# Patient Record
Sex: FEMALE | HISPANIC OR LATINO | Employment: UNEMPLOYED
[De-identification: names, ages, dates, MRNs, and addresses within clinical notes are randomized per-mention and may not be internally consistent; named-entity substitution may affect disease eponyms.]

---

## 2017-05-23 ENCOUNTER — RECORDS - HEALTHEAST (OUTPATIENT)
Dept: ADMINISTRATIVE | Facility: OTHER | Age: 37
End: 2017-05-23

## 2017-08-09 ENCOUNTER — RECORDS - HEALTHEAST (OUTPATIENT)
Dept: ADMINISTRATIVE | Facility: OTHER | Age: 37
End: 2017-08-09

## 2017-08-24 ENCOUNTER — RECORDS - HEALTHEAST (OUTPATIENT)
Dept: ADMINISTRATIVE | Facility: OTHER | Age: 37
End: 2017-08-24

## 2017-08-30 ENCOUNTER — RECORDS - HEALTHEAST (OUTPATIENT)
Dept: ADMINISTRATIVE | Facility: OTHER | Age: 37
End: 2017-08-30

## 2017-09-01 ENCOUNTER — COMMUNICATION - HEALTHEAST (OUTPATIENT)
Dept: ADMINISTRATIVE | Facility: CLINIC | Age: 37
End: 2017-09-01

## 2017-09-01 ENCOUNTER — RECORDS - HEALTHEAST (OUTPATIENT)
Dept: ADMINISTRATIVE | Facility: OTHER | Age: 37
End: 2017-09-01

## 2017-09-03 ENCOUNTER — HEALTH MAINTENANCE LETTER (OUTPATIENT)
Age: 37
End: 2017-09-03

## 2017-09-20 ENCOUNTER — COMMUNICATION - HEALTHEAST (OUTPATIENT)
Dept: OBGYN | Facility: CLINIC | Age: 37
End: 2017-09-20

## 2017-10-06 ENCOUNTER — RECORDS - HEALTHEAST (OUTPATIENT)
Dept: ADMINISTRATIVE | Facility: OTHER | Age: 37
End: 2017-10-06

## 2017-10-11 ENCOUNTER — RECORDS - HEALTHEAST (OUTPATIENT)
Dept: ADMINISTRATIVE | Facility: OTHER | Age: 37
End: 2017-10-11

## 2017-10-11 ENCOUNTER — COMMUNICATION - HEALTHEAST (OUTPATIENT)
Dept: ADMINISTRATIVE | Facility: CLINIC | Age: 37
End: 2017-10-11

## 2017-10-17 ENCOUNTER — COMMUNICATION - HEALTHEAST (OUTPATIENT)
Dept: OBGYN | Facility: CLINIC | Age: 37
End: 2017-10-17

## 2017-10-20 ENCOUNTER — RECORDS - HEALTHEAST (OUTPATIENT)
Dept: ADMINISTRATIVE | Facility: OTHER | Age: 37
End: 2017-10-20

## 2017-10-24 ENCOUNTER — RECORDS - HEALTHEAST (OUTPATIENT)
Dept: ADMINISTRATIVE | Facility: OTHER | Age: 37
End: 2017-10-24

## 2017-12-07 ENCOUNTER — RECORDS - HEALTHEAST (OUTPATIENT)
Dept: ADMINISTRATIVE | Facility: OTHER | Age: 37
End: 2017-12-07

## 2017-12-22 ENCOUNTER — HOSPITAL ENCOUNTER (OUTPATIENT)
Dept: MEDSURG UNIT | Facility: CLINIC | Age: 37
Discharge: HOME | End: 2017-12-23
Attending: OBSTETRICS & GYNECOLOGY | Admitting: OBSTETRICS & GYNECOLOGY

## 2017-12-22 ASSESSMENT — MIFFLIN-ST. JEOR: SCORE: 1322.25

## 2017-12-23 ENCOUNTER — RECORDS - HEALTHEAST (OUTPATIENT)
Dept: ADMINISTRATIVE | Facility: OTHER | Age: 37
End: 2017-12-23

## 2017-12-26 ENCOUNTER — HOME CARE/HOSPICE - HEALTHEAST (OUTPATIENT)
Dept: HOME HEALTH SERVICES | Facility: HOME HEALTH | Age: 37
End: 2017-12-26

## 2017-12-28 ENCOUNTER — HOME CARE/HOSPICE - HEALTHEAST (OUTPATIENT)
Dept: HOME HEALTH SERVICES | Facility: HOME HEALTH | Age: 37
End: 2017-12-28

## 2018-02-15 ENCOUNTER — RECORDS - HEALTHEAST (OUTPATIENT)
Dept: ADMINISTRATIVE | Facility: OTHER | Age: 38
End: 2018-02-15

## 2018-09-10 ENCOUNTER — HEALTH MAINTENANCE LETTER (OUTPATIENT)
Age: 38
End: 2018-09-10

## 2019-11-07 ENCOUNTER — HEALTH MAINTENANCE LETTER (OUTPATIENT)
Age: 39
End: 2019-11-07

## 2020-07-08 ENCOUNTER — COMMUNICATION - HEALTHEAST (OUTPATIENT)
Dept: ADMINISTRATIVE | Facility: CLINIC | Age: 40
End: 2020-07-08

## 2020-07-10 ENCOUNTER — COMMUNICATION - HEALTHEAST (OUTPATIENT)
Dept: MIDWIFE SERVICES | Facility: CLINIC | Age: 40
End: 2020-07-10

## 2020-07-10 ENCOUNTER — COMMUNICATION - HEALTHEAST (OUTPATIENT)
Dept: ADMINISTRATIVE | Facility: CLINIC | Age: 40
End: 2020-07-10

## 2020-07-20 ENCOUNTER — OFFICE VISIT - HEALTHEAST (OUTPATIENT)
Dept: MIDWIFE SERVICES | Facility: CLINIC | Age: 40
End: 2020-07-20

## 2020-07-20 DIAGNOSIS — D68.00 VON WILLEBRAND DISEASE (H): ICD-10-CM

## 2020-07-20 DIAGNOSIS — N92.6 MISSED MENSES: ICD-10-CM

## 2020-07-20 LAB — HCG UR QL: POSITIVE

## 2020-07-20 ASSESSMENT — MIFFLIN-ST. JEOR: SCORE: 1191.38

## 2020-07-22 ENCOUNTER — AMBULATORY - HEALTHEAST (OUTPATIENT)
Dept: MIDWIFE SERVICES | Facility: CLINIC | Age: 40
End: 2020-07-22

## 2020-07-23 ENCOUNTER — AMBULATORY - HEALTHEAST (OUTPATIENT)
Dept: OBGYN | Facility: CLINIC | Age: 40
End: 2020-07-23

## 2020-07-23 ENCOUNTER — COMMUNICATION - HEALTHEAST (OUTPATIENT)
Dept: SCHEDULING | Facility: CLINIC | Age: 40
End: 2020-07-23

## 2020-07-27 ENCOUNTER — RECORDS - HEALTHEAST (OUTPATIENT)
Dept: ADMINISTRATIVE | Facility: OTHER | Age: 40
End: 2020-07-27

## 2020-11-12 ENCOUNTER — AMBULATORY - HEALTHEAST (OUTPATIENT)
Dept: MATERNAL FETAL MEDICINE | Facility: HOSPITAL | Age: 40
End: 2020-11-12

## 2020-11-12 ENCOUNTER — RECORDS - HEALTHEAST (OUTPATIENT)
Dept: ADMINISTRATIVE | Facility: OTHER | Age: 40
End: 2020-11-12

## 2020-11-12 DIAGNOSIS — O26.90 PREGNANCY, ANTEPARTUM, COMPLICATIONS: ICD-10-CM

## 2020-11-27 ENCOUNTER — AMBULATORY - HEALTHEAST (OUTPATIENT)
Dept: MATERNAL FETAL MEDICINE | Facility: HOSPITAL | Age: 40
End: 2020-11-27

## 2020-11-29 ENCOUNTER — HEALTH MAINTENANCE LETTER (OUTPATIENT)
Age: 40
End: 2020-11-29

## 2020-12-02 ENCOUNTER — OFFICE VISIT - HEALTHEAST (OUTPATIENT)
Dept: MATERNAL FETAL MEDICINE | Facility: HOSPITAL | Age: 40
End: 2020-12-02

## 2020-12-02 DIAGNOSIS — O09.521 AMA (ADVANCED MATERNAL AGE) MULTIGRAVIDA 35+, FIRST TRIMESTER: ICD-10-CM

## 2020-12-02 DIAGNOSIS — E72.12 MTHFR (METHYLENE THF REDUCTASE) DEFICIENCY AND HOMOCYSTINURIA (H): ICD-10-CM

## 2020-12-02 DIAGNOSIS — O30.041 TWIN PREGNANCY, DICHORIONIC/DIAMNIOTIC, FIRST TRIMESTER: ICD-10-CM

## 2020-12-02 DIAGNOSIS — E72.11 MTHFR (METHYLENE THF REDUCTASE) DEFICIENCY AND HOMOCYSTINURIA (H): ICD-10-CM

## 2020-12-02 DIAGNOSIS — Z83.2 FAMILY HISTORY OF VON WILLEBRAND DISEASE: ICD-10-CM

## 2020-12-02 DIAGNOSIS — D68.00 VON WILLEBRAND DISEASE (H): ICD-10-CM

## 2020-12-02 DIAGNOSIS — Z36.9 PRENATAL SCREENING ENCOUNTER: ICD-10-CM

## 2020-12-02 DIAGNOSIS — O26.90 PREGNANCY, ANTEPARTUM, COMPLICATIONS: ICD-10-CM

## 2020-12-02 DIAGNOSIS — Z80.41 FAMILY HISTORY OF OVARIAN CANCER: ICD-10-CM

## 2020-12-02 DIAGNOSIS — O26.21 RECURRENT PREGNANCY LOSS IN PREGNANT PATIENT IN FIRST TRIMESTER, ANTEPARTUM: ICD-10-CM

## 2020-12-02 DIAGNOSIS — Z15.89 COMPOUND HETEROZYGOUS MTHFR MUTATION C677T/A1298C: ICD-10-CM

## 2020-12-09 ENCOUNTER — COMMUNICATION - HEALTHEAST (OUTPATIENT)
Dept: MATERNAL FETAL MEDICINE | Facility: HOSPITAL | Age: 40
End: 2020-12-09

## 2020-12-09 DIAGNOSIS — Z36.9 PRENATAL SCREENING ENCOUNTER: ICD-10-CM

## 2020-12-09 DIAGNOSIS — O09.521 AMA (ADVANCED MATERNAL AGE) MULTIGRAVIDA 35+, FIRST TRIMESTER: ICD-10-CM

## 2020-12-09 DIAGNOSIS — O30.049 TWIN GESTATION, DICHORIONIC DIAMNIOTIC: ICD-10-CM

## 2020-12-10 ENCOUNTER — AMBULATORY - HEALTHEAST (OUTPATIENT)
Dept: LAB | Facility: HOSPITAL | Age: 40
End: 2020-12-10

## 2020-12-10 DIAGNOSIS — O30.049 TWIN GESTATION, DICHORIONIC DIAMNIOTIC: ICD-10-CM

## 2020-12-10 DIAGNOSIS — Z36.9 PRENATAL SCREENING ENCOUNTER: ICD-10-CM

## 2020-12-10 DIAGNOSIS — O09.521 AMA (ADVANCED MATERNAL AGE) MULTIGRAVIDA 35+, FIRST TRIMESTER: ICD-10-CM

## 2020-12-14 ENCOUNTER — COMMUNICATION - HEALTHEAST (OUTPATIENT)
Dept: MATERNAL FETAL MEDICINE | Facility: HOSPITAL | Age: 40
End: 2020-12-14

## 2020-12-14 LAB — TRISOMY 21,18,13 - HE HISTORICAL: NORMAL

## 2021-01-14 ENCOUNTER — RECORDS - HEALTHEAST (OUTPATIENT)
Dept: ADMINISTRATIVE | Facility: OTHER | Age: 41
End: 2021-01-14

## 2021-01-22 ENCOUNTER — OFFICE VISIT - HEALTHEAST (OUTPATIENT)
Dept: MATERNAL FETAL MEDICINE | Facility: HOSPITAL | Age: 41
End: 2021-01-22

## 2021-01-22 ENCOUNTER — RECORDS - HEALTHEAST (OUTPATIENT)
Dept: ULTRASOUND IMAGING | Facility: HOSPITAL | Age: 41
End: 2021-01-22

## 2021-01-22 ENCOUNTER — RECORDS - HEALTHEAST (OUTPATIENT)
Dept: ADMINISTRATIVE | Facility: OTHER | Age: 41
End: 2021-01-22

## 2021-01-22 DIAGNOSIS — O30.042 DICHORIONIC DIAMNIOTIC TWIN PREGNANCY IN SECOND TRIMESTER: ICD-10-CM

## 2021-01-22 DIAGNOSIS — O26.90 PREGNANCY RELATED CONDITIONS, UNSPECIFIED, UNSPECIFIED TRIMESTER: ICD-10-CM

## 2021-01-28 ENCOUNTER — RECORDS - HEALTHEAST (OUTPATIENT)
Dept: ADMINISTRATIVE | Facility: OTHER | Age: 41
End: 2021-01-28

## 2021-02-26 ENCOUNTER — AMBULATORY - HEALTHEAST (OUTPATIENT)
Dept: MATERNAL FETAL MEDICINE | Facility: HOSPITAL | Age: 41
End: 2021-02-26

## 2021-02-26 ENCOUNTER — RECORDS - HEALTHEAST (OUTPATIENT)
Dept: ADMINISTRATIVE | Facility: OTHER | Age: 41
End: 2021-02-26

## 2021-02-26 DIAGNOSIS — O26.90 PREGNANCY, ANTEPARTUM, COMPLICATIONS: ICD-10-CM

## 2021-03-03 ENCOUNTER — OFFICE VISIT - HEALTHEAST (OUTPATIENT)
Dept: MATERNAL FETAL MEDICINE | Facility: HOSPITAL | Age: 41
End: 2021-03-03

## 2021-03-03 ENCOUNTER — RECORDS - HEALTHEAST (OUTPATIENT)
Dept: ADMINISTRATIVE | Facility: OTHER | Age: 41
End: 2021-03-03

## 2021-03-03 ENCOUNTER — RECORDS - HEALTHEAST (OUTPATIENT)
Dept: ULTRASOUND IMAGING | Facility: HOSPITAL | Age: 41
End: 2021-03-03

## 2021-03-03 DIAGNOSIS — O26.90 PREGNANCY RELATED CONDITIONS, UNSPECIFIED, UNSPECIFIED TRIMESTER: ICD-10-CM

## 2021-03-03 DIAGNOSIS — O30.042 DICHORIONIC DIAMNIOTIC TWIN PREGNANCY IN SECOND TRIMESTER: ICD-10-CM

## 2021-03-27 ENCOUNTER — HOSPITAL ENCOUNTER (OUTPATIENT)
Dept: OBGYN | Facility: HOSPITAL | Age: 41
Discharge: HOME | End: 2021-03-27
Attending: OBSTETRICS & GYNECOLOGY | Admitting: OBSTETRICS & GYNECOLOGY

## 2021-03-27 ENCOUNTER — COMMUNICATION - HEALTHEAST (OUTPATIENT)
Dept: SCHEDULING | Facility: CLINIC | Age: 41
End: 2021-03-27

## 2021-03-27 LAB
ALBUMIN UR-MCNC: NEGATIVE G/DL
APPEARANCE UR: CLEAR
BILIRUB UR QL STRIP: NEGATIVE
COLOR UR AUTO: COLORLESS
GLUCOSE UR STRIP-MCNC: ABNORMAL MG/DL
HGB UR QL STRIP: NEGATIVE
KETONES UR STRIP-MCNC: NEGATIVE MG/DL
LEUKOCYTE ESTERASE UR QL STRIP: NEGATIVE
NITRATE UR QL: NEGATIVE
PH UR STRIP: 5.5 [PH] (ref 5–8)
SP GR UR STRIP: 1.01 (ref 1–1.03)
UROBILINOGEN UR STRIP-ACNC: ABNORMAL

## 2021-03-27 ASSESSMENT — MIFFLIN-ST. JEOR: SCORE: 1330.42

## 2021-03-31 ENCOUNTER — RECORDS - HEALTHEAST (OUTPATIENT)
Dept: ADMINISTRATIVE | Facility: OTHER | Age: 41
End: 2021-03-31

## 2021-03-31 ENCOUNTER — RECORDS - HEALTHEAST (OUTPATIENT)
Dept: ULTRASOUND IMAGING | Facility: HOSPITAL | Age: 41
End: 2021-03-31

## 2021-03-31 ENCOUNTER — OFFICE VISIT - HEALTHEAST (OUTPATIENT)
Dept: MATERNAL FETAL MEDICINE | Facility: HOSPITAL | Age: 41
End: 2021-03-31

## 2021-03-31 DIAGNOSIS — O30.042 TWIN PREGNANCY, DICHORIONIC/DIAMNIOTIC, SECOND TRIMESTER: ICD-10-CM

## 2021-03-31 DIAGNOSIS — O30.042 DICHORIONIC DIAMNIOTIC TWIN PREGNANCY IN SECOND TRIMESTER: ICD-10-CM

## 2021-04-20 ENCOUNTER — HOSPITAL ENCOUNTER (OUTPATIENT)
Dept: OBGYN | Facility: HOSPITAL | Age: 41
Discharge: HOME | End: 2021-04-20
Attending: OBSTETRICS & GYNECOLOGY | Admitting: OBSTETRICS & GYNECOLOGY

## 2021-04-20 ASSESSMENT — MIFFLIN-ST. JEOR: SCORE: 1331.32

## 2021-04-30 ENCOUNTER — RECORDS - HEALTHEAST (OUTPATIENT)
Dept: ULTRASOUND IMAGING | Facility: HOSPITAL | Age: 41
End: 2021-04-30

## 2021-04-30 ENCOUNTER — RECORDS - HEALTHEAST (OUTPATIENT)
Dept: ADMINISTRATIVE | Facility: OTHER | Age: 41
End: 2021-04-30

## 2021-04-30 ENCOUNTER — OFFICE VISIT - HEALTHEAST (OUTPATIENT)
Dept: MATERNAL FETAL MEDICINE | Facility: HOSPITAL | Age: 41
End: 2021-04-30

## 2021-04-30 DIAGNOSIS — O09.521 AMA (ADVANCED MATERNAL AGE) MULTIGRAVIDA 35+, FIRST TRIMESTER: ICD-10-CM

## 2021-04-30 DIAGNOSIS — O30.043 DICHORIONIC DIAMNIOTIC TWIN PREGNANCY IN THIRD TRIMESTER: ICD-10-CM

## 2021-04-30 DIAGNOSIS — O36.5931 INTRAUTERINE GROWTH RESTRICTION AFFECTING ANTEPARTUM CARE OF MOTHER IN THIRD TRIMESTER, FETUS 1: ICD-10-CM

## 2021-04-30 DIAGNOSIS — O30.042 TWIN PREGNANCY, DICHORIONIC/DIAMNIOTIC, SECOND TRIMESTER: ICD-10-CM

## 2021-05-03 ENCOUNTER — OFFICE VISIT - HEALTHEAST (OUTPATIENT)
Dept: MATERNAL FETAL MEDICINE | Facility: HOSPITAL | Age: 41
End: 2021-05-03

## 2021-05-03 ENCOUNTER — RECORDS - HEALTHEAST (OUTPATIENT)
Dept: ULTRASOUND IMAGING | Facility: HOSPITAL | Age: 41
End: 2021-05-03

## 2021-05-03 ENCOUNTER — RECORDS - HEALTHEAST (OUTPATIENT)
Dept: ADMINISTRATIVE | Facility: OTHER | Age: 41
End: 2021-05-03

## 2021-05-03 DIAGNOSIS — O30.043 DICHORIONIC DIAMNIOTIC TWIN PREGNANCY IN THIRD TRIMESTER: ICD-10-CM

## 2021-05-03 DIAGNOSIS — O30.043 TWIN PREGNANCY, DICHORIONIC/DIAMNIOTIC, THIRD TRIMESTER: ICD-10-CM

## 2021-05-03 DIAGNOSIS — O36.5931 MATERNAL CARE FOR OTHER KNOWN OR SUSPECTED POOR FETAL GROWTH, THIRD TRIMESTER, FETUS 1: ICD-10-CM

## 2021-05-03 DIAGNOSIS — O09.521 AMA (ADVANCED MATERNAL AGE) MULTIGRAVIDA 35+, FIRST TRIMESTER: ICD-10-CM

## 2021-05-03 DIAGNOSIS — O09.521 SUPERVISION OF ELDERLY MULTIGRAVIDA, FIRST TRIMESTER: ICD-10-CM

## 2021-05-03 DIAGNOSIS — O36.5931 INTRAUTERINE GROWTH RESTRICTION AFFECTING ANTEPARTUM CARE OF MOTHER IN THIRD TRIMESTER, FETUS 1: ICD-10-CM

## 2021-05-06 ENCOUNTER — RECORDS - HEALTHEAST (OUTPATIENT)
Dept: ADMINISTRATIVE | Facility: OTHER | Age: 41
End: 2021-05-06

## 2021-05-06 ENCOUNTER — OFFICE VISIT - HEALTHEAST (OUTPATIENT)
Dept: MATERNAL FETAL MEDICINE | Facility: HOSPITAL | Age: 41
End: 2021-05-06

## 2021-05-06 ENCOUNTER — RECORDS - HEALTHEAST (OUTPATIENT)
Dept: ULTRASOUND IMAGING | Facility: HOSPITAL | Age: 41
End: 2021-05-06

## 2021-05-06 DIAGNOSIS — O30.043 DICHORIONIC DIAMNIOTIC TWIN PREGNANCY IN THIRD TRIMESTER: ICD-10-CM

## 2021-05-06 DIAGNOSIS — O36.5931 MATERNAL CARE FOR OTHER KNOWN OR SUSPECTED POOR FETAL GROWTH, THIRD TRIMESTER, FETUS 1: ICD-10-CM

## 2021-05-06 DIAGNOSIS — O09.521 SUPERVISION OF ELDERLY MULTIGRAVIDA, FIRST TRIMESTER: ICD-10-CM

## 2021-05-06 DIAGNOSIS — O09.521 AMA (ADVANCED MATERNAL AGE) MULTIGRAVIDA 35+, FIRST TRIMESTER: ICD-10-CM

## 2021-05-06 DIAGNOSIS — O36.5931 INTRAUTERINE GROWTH RESTRICTION AFFECTING ANTEPARTUM CARE OF MOTHER IN THIRD TRIMESTER, FETUS 1: ICD-10-CM

## 2021-05-06 DIAGNOSIS — O30.043 TWIN PREGNANCY, DICHORIONIC/DIAMNIOTIC, THIRD TRIMESTER: ICD-10-CM

## 2021-05-10 ENCOUNTER — RECORDS - HEALTHEAST (OUTPATIENT)
Dept: ULTRASOUND IMAGING | Facility: HOSPITAL | Age: 41
End: 2021-05-10

## 2021-05-10 ENCOUNTER — RECORDS - HEALTHEAST (OUTPATIENT)
Dept: ADMINISTRATIVE | Facility: OTHER | Age: 41
End: 2021-05-10

## 2021-05-10 ENCOUNTER — OFFICE VISIT - HEALTHEAST (OUTPATIENT)
Dept: MATERNAL FETAL MEDICINE | Facility: HOSPITAL | Age: 41
End: 2021-05-10

## 2021-05-10 DIAGNOSIS — O09.521 AMA (ADVANCED MATERNAL AGE) MULTIGRAVIDA 35+, FIRST TRIMESTER: ICD-10-CM

## 2021-05-10 DIAGNOSIS — O30.043 DICHORIONIC DIAMNIOTIC TWIN PREGNANCY IN THIRD TRIMESTER: ICD-10-CM

## 2021-05-10 DIAGNOSIS — O36.5931 INTRAUTERINE GROWTH RESTRICTION AFFECTING ANTEPARTUM CARE OF MOTHER IN THIRD TRIMESTER, FETUS 1: ICD-10-CM

## 2021-05-10 DIAGNOSIS — O09.521 SUPERVISION OF ELDERLY MULTIGRAVIDA, FIRST TRIMESTER: ICD-10-CM

## 2021-05-10 DIAGNOSIS — O36.5931 MATERNAL CARE FOR OTHER KNOWN OR SUSPECTED POOR FETAL GROWTH, THIRD TRIMESTER, FETUS 1: ICD-10-CM

## 2021-05-10 DIAGNOSIS — O30.043 TWIN PREGNANCY, DICHORIONIC/DIAMNIOTIC, THIRD TRIMESTER: ICD-10-CM

## 2021-05-11 ENCOUNTER — AMBULATORY - HEALTHEAST (OUTPATIENT)
Dept: OBGYN | Facility: CLINIC | Age: 41
End: 2021-05-11

## 2021-05-11 DIAGNOSIS — Z20.822 ENCOUNTER FOR LABORATORY TESTING FOR COVID-19 VIRUS: ICD-10-CM

## 2021-05-13 ENCOUNTER — RECORDS - HEALTHEAST (OUTPATIENT)
Dept: ADMINISTRATIVE | Facility: OTHER | Age: 41
End: 2021-05-13

## 2021-05-13 ENCOUNTER — OFFICE VISIT - HEALTHEAST (OUTPATIENT)
Dept: MATERNAL FETAL MEDICINE | Facility: HOSPITAL | Age: 41
End: 2021-05-13

## 2021-05-13 ENCOUNTER — RECORDS - HEALTHEAST (OUTPATIENT)
Dept: ULTRASOUND IMAGING | Facility: HOSPITAL | Age: 41
End: 2021-05-13

## 2021-05-13 DIAGNOSIS — O09.521 AMA (ADVANCED MATERNAL AGE) MULTIGRAVIDA 35+, FIRST TRIMESTER: ICD-10-CM

## 2021-05-13 DIAGNOSIS — O30.043 DICHORIONIC DIAMNIOTIC TWIN PREGNANCY IN THIRD TRIMESTER: ICD-10-CM

## 2021-05-13 DIAGNOSIS — O36.5931 INTRAUTERINE GROWTH RESTRICTION AFFECTING ANTEPARTUM CARE OF MOTHER IN THIRD TRIMESTER, FETUS 1: ICD-10-CM

## 2021-05-13 DIAGNOSIS — O30.043 TWIN PREGNANCY, DICHORIONIC/DIAMNIOTIC, THIRD TRIMESTER: ICD-10-CM

## 2021-05-13 DIAGNOSIS — O09.521 SUPERVISION OF ELDERLY MULTIGRAVIDA, FIRST TRIMESTER: ICD-10-CM

## 2021-05-13 DIAGNOSIS — O36.5931 MATERNAL CARE FOR OTHER KNOWN OR SUSPECTED POOR FETAL GROWTH, THIRD TRIMESTER, FETUS 1: ICD-10-CM

## 2021-05-16 ENCOUNTER — AMBULATORY - HEALTHEAST (OUTPATIENT)
Dept: FAMILY MEDICINE | Facility: CLINIC | Age: 41
End: 2021-05-16

## 2021-05-16 DIAGNOSIS — Z20.822 ENCOUNTER FOR LABORATORY TESTING FOR COVID-19 VIRUS: ICD-10-CM

## 2021-05-17 ENCOUNTER — RECORDS - HEALTHEAST (OUTPATIENT)
Dept: ULTRASOUND IMAGING | Facility: HOSPITAL | Age: 41
End: 2021-05-17

## 2021-05-17 ENCOUNTER — OFFICE VISIT - HEALTHEAST (OUTPATIENT)
Dept: MATERNAL FETAL MEDICINE | Facility: HOSPITAL | Age: 41
End: 2021-05-17

## 2021-05-17 ENCOUNTER — RECORDS - HEALTHEAST (OUTPATIENT)
Dept: ADMINISTRATIVE | Facility: OTHER | Age: 41
End: 2021-05-17

## 2021-05-17 DIAGNOSIS — O09.521 SUPERVISION OF ELDERLY MULTIGRAVIDA, FIRST TRIMESTER: ICD-10-CM

## 2021-05-17 DIAGNOSIS — O30.043 TWIN PREGNANCY, DICHORIONIC/DIAMNIOTIC, THIRD TRIMESTER: ICD-10-CM

## 2021-05-17 DIAGNOSIS — O09.521 AMA (ADVANCED MATERNAL AGE) MULTIGRAVIDA 35+, FIRST TRIMESTER: ICD-10-CM

## 2021-05-17 DIAGNOSIS — O30.043 DICHORIONIC DIAMNIOTIC TWIN PREGNANCY IN THIRD TRIMESTER: ICD-10-CM

## 2021-05-17 DIAGNOSIS — O36.5931 MATERNAL CARE FOR OTHER KNOWN OR SUSPECTED POOR FETAL GROWTH, THIRD TRIMESTER, FETUS 1: ICD-10-CM

## 2021-05-17 DIAGNOSIS — O36.5931 INTRAUTERINE GROWTH RESTRICTION AFFECTING ANTEPARTUM CARE OF MOTHER IN THIRD TRIMESTER, FETUS 1: ICD-10-CM

## 2021-05-17 LAB
SARS-COV-2 PCR COMMENT: NORMAL
SARS-COV-2 RNA SPEC QL NAA+PROBE: NEGATIVE
SARS-COV-2 VIRUS SPECIMEN SOURCE: NORMAL

## 2021-05-18 ENCOUNTER — COMMUNICATION - HEALTHEAST (OUTPATIENT)
Dept: SCHEDULING | Facility: CLINIC | Age: 41
End: 2021-05-18

## 2021-05-19 ENCOUNTER — SURGERY - HEALTHEAST (OUTPATIENT)
Dept: OBGYN | Facility: HOSPITAL | Age: 41
End: 2021-05-19
Payer: COMMERCIAL

## 2021-05-19 ENCOUNTER — RECORDS - HEALTHEAST (OUTPATIENT)
Dept: ADMINISTRATIVE | Facility: OTHER | Age: 41
End: 2021-05-19

## 2021-05-19 ENCOUNTER — ANESTHESIA - HEALTHEAST (OUTPATIENT)
Dept: OBGYN | Facility: HOSPITAL | Age: 41
End: 2021-05-19

## 2021-05-19 ASSESSMENT — MIFFLIN-ST. JEOR: SCORE: 1306.49

## 2021-05-26 VITALS
BODY MASS INDEX: 20.49 KG/M2 | HEIGHT: 64 IN | WEIGHT: 120 LBS | SYSTOLIC BLOOD PRESSURE: 106 MMHG | DIASTOLIC BLOOD PRESSURE: 54 MMHG | HEART RATE: 76 BPM

## 2021-05-27 VITALS — BODY MASS INDEX: 29.53 KG/M2 | WEIGHT: 155 LBS | HEIGHT: 61 IN | BODY MASS INDEX: 22.86 KG/M2

## 2021-05-31 VITALS — WEIGHT: 146 LBS | BODY MASS INDEX: 24.92 KG/M2 | HEIGHT: 64 IN

## 2021-06-03 ENCOUNTER — COMMUNICATION - HEALTHEAST (OUTPATIENT)
Dept: ADMINISTRATIVE | Facility: CLINIC | Age: 41
End: 2021-06-03

## 2021-06-05 VITALS — HEIGHT: 64 IN | WEIGHT: 148 LBS | BODY MASS INDEX: 25.27 KG/M2

## 2021-06-05 VITALS — WEIGHT: 147.8 LBS | BODY MASS INDEX: 25.23 KG/M2 | HEIGHT: 64 IN

## 2021-06-09 ENCOUNTER — COMMUNICATION - HEALTHEAST (OUTPATIENT)
Dept: ADMINISTRATIVE | Facility: CLINIC | Age: 41
End: 2021-06-09

## 2021-06-09 NOTE — TELEPHONE ENCOUNTER
Pt is having some breathing issues and thinks she is going to have a Covid test done. She would like a call about if there are any precautions (other than wearing a mask) that she should take going in for the test, since she is in early stage of pregnancy. OK to leave a message.

## 2021-06-09 NOTE — TELEPHONE ENCOUNTER
Pt was going to transfer her care to us in Oct of 2017 but was denied eligibility to see our group. Pt is now pregnant again and is reluctant to start care anywhere if she is not going to be able to be seen there. She is looking to talk to a CNM to see if her eligibility to see us would still be denied for this pregnancy? Please review notes from 2017 and call pt to discuss if she would be eligible for CNM care

## 2021-06-09 NOTE — PROGRESS NOTES
Assessment:     , 6w2d by LMP  Von Willebrand Disease  High-risk pregnancy     Plan:   1. Discussed working Estimated Date of Delivery: 3/13/2021 . Will get US to confirm dating. Referral given. Recommend next week for optimal timing based on LMP.  2. Records collected from [facility], [facility], [facility], [place] [facility]. Scanned records into chart not available via [facility]. Request sent for records from [facility]. Reviewed history with Dr. Amaya OBGYKEMI. In agreement Fatuma is not appropriate for midwifery care given her well-documented history of Von Willebrand from -present.  3. 1st trimester teaching: encouraged well-balanced diet, pre- vitamins, vitamin D3 and omega 3 supplements, daily exercise. Discussed warning signs and when to call.   4. Transfer of care to [facility]-- reviewed by phone on 2020. Fatuma declines referral to OBGYN group at this time, but will seek care closer to her house.    LOLI Trivedi, [facility]    Total time spent with patient 25 minutes, >50% counseling, education and coordination of care.     Subjective:     Fatuma is a 39 y.o. y.o.  who presents for pregnancy confirmation. pregnancy is planned/desired. Contraception: none.      Current symptoms also include: positive home pregnancy test. Feeling run down. Occasional vertigo.     Patient's last menstrual period was 2020 (exact date).. She is certain of this date. Menstrual cycles are regular, occuring every 28 days.  Last period was normal.    Fatuma received care from [facility], and [facility] last pregnancy (2017). She attempted transfer to [facility] but was declined transfer due to diagnosis of Von Willebrand. She strongly desires midwifery care, but feels like her diagnosis of Von Willebrand may have been uncertain / in question after her 2017 pregnancy. Brings all of her records today for scanning / review to see if she is appropriate for [facility] care this  "pregnancy.    Review of Systems  Denies bleeding, pain or cramping, abnormal vaginal discharge or dysuria.    Objective:     /54 (Patient Site: Right Arm, Patient Position: Sitting, Cuff Size: Adult Regular)   Pulse 76   Ht 5' 3.5\" (1.613 m)   Wt 120 lb (54.4 kg)   LMP 06/06/2020 (Exact Date)   Breastfeeding No   BMI 20.92 kg/m     General: alert and no acute distress      Lab Review  Urine HCG: positive    Extensive review of charts from [facility], [facility], [facility], and [place]-- diagnosis of Von Willebrand disease made by Dr. Ranjan Bishop (Oncology) 2/11/2011, also confirmed by [facility] 9/1/2017 and [facility] 10/11/2017.    "

## 2021-06-09 NOTE — PROGRESS NOTES
[facility].  Labs obtained December 7, 2017: Von Willebrand factor multimer normal, coag factor VIII activity 200% (normal), von Willebrand factor antigen 163% (normal), von Willebrand factor activity 128% (normal), coagulation interpretation: No laboratory evidence of von Willebrand disease.  Also included were prenatal record and ultrasound as well as postpartum visit from her 2017/2018 pregnancy.

## 2021-06-09 NOTE — TELEPHONE ENCOUNTER
TC:   Having momments of shortness of breath and feels winded and tired.  says she has been coughing at night.   4 weeks 6 days by LMP.  Has Asthma and has a action plan in place and is following it. Hx of allergies as well  Visit Monday for Covid test.   -> has call out to her specialist for asthma.   [employer].   -has record request out to see if she can be seen by us for care in pregnancy.   STABLE currently with plan in place should her shortness of breath become more severe.   LOLI Parada,CNM

## 2021-06-09 NOTE — TELEPHONE ENCOUNTER
"Seen Monday Midwives.  Bad abdominal cramps all morning and having another miscarriage.  Heavy bleeding.    Has not filled a pad.    Now she is 6 1/2 weeks.    Now the OB is returning her call back.    Makayla Rose RN [facility] Triage Nurse Advisor        Reason for Disposition    MILD vaginal bleeding (i.e., less than 1 pad / hour; less than patient's usual menstrual bleeding; not just spotting)    Additional Information    Negative: Shock suspected (e.g., cold/pale/clammy skin, too weak to stand, low BP, rapid pulse)    Negative: Difficult to awaken or acting confused (e.g., disoriented, slurred speech)    Negative: Passed out (i.e., lost consciousness, collapsed and was not responding)    Negative: Sounds like a life-threatening emergency to the triager    Negative: [1] Vaginal bleeding AND [2] pregnant > 20 weeks    Negative: Not pregnant or pregnancy status unknown    Negative: SEVERE abdominal pain    Negative: [1] SEVERE vaginal bleeding (i.e., soaking 2 pads / hour, large blood clots) AND [2] present 2 or more hours    Negative: SEVERE dizziness (e.g., unable to stand, requires support to walk, feels like passing out)    Negative: [1] MODERATE vaginal bleeding (i.e., soaking 1 pad / hour; clots) AND [2] present > 6 hours    Negative: [1] MODERATE vaginal bleeding (i.e., soaking 1 pad / hour; clots) AND [2] pregnant > 12 weeks    Negative: Passed tissue (e.g., gray-white)    Negative: Shoulder pain    Negative: Pale skin (pallor) of new onset or worsening    Negative: Patient sounds very sick or weak to the triager    Negative: [1] Constant abdominal pain AND [2] present > 2 hours    Negative: Fever > 100.4 F (38.0 C)    Negative: [1] Intermittent lower abdominal pain (e.g., cramping) AND [2] present > 24 hours    Negative: Prior history of \"ectopic pregnancy\" or previous tubal surgery (e.g., tubal ligation)    Negative: Pain or burning with passing urine (urination)    Negative: MODERATE vaginal bleeding " (i.e., soaking 1 pad / hour; clots)    Negative: Has IUD    Protocols used: PREGNANCY - VAGINAL BLEEDING LESS THAN 20 WEEKS [facility]-

## 2021-06-13 NOTE — TELEPHONE ENCOUNTER
December 14, 2020    I spoke with Fatuma regarding her NIPT results.     Results indicate NO ANEUPLOIDY DETECTED for chromosomes 21, 18, or 13. Per the patient's request during our initial genetic counseling consult, she elected to proceed with Y chromosome analysis as part of her NIPT screen. Testing revealed that at least one of the twins is predicted to be male, as the presence of Y chromosome material was detected. This information was NOT shared with Fatuma today per her request; she has asked that an envelope with the predicted sex information be mailed to her home address in the upcoming days. I will have this mailed out to the home address we have on file for Fatuma.     These results put the patient's current pregnancy at low risk for Down syndrome, trisomy 18, trisomy 13 and sex chromosome abnormalities. Although these results are reassuring, this does not replace a standard chromosome analysis from a chorionic villus sampling or amniocentesis in the current diamniotic dichorionic twin gestation. The patient has declined proceeding with diagnostic invasive prenatal testing at this time.    Fatuma is scheduled on 1/22/21 for a level II comprehensive ultrasound with our clinic.     MSAFP is the appropriate second trimester screening test for open neural tube defects; the maternal quad screen is not recommended. Her results are available in her Epic chart for her primary OB to review.    Lastly, Fatuma would like to complete release of information forms for [facility] and [facility] so that we may access her von Willebrand testing results. Fatuma reports she had von Willebrand testing at [facility] in 2017 and again at [facility] in 2020. We can see in care everywhere that she had testing completed in 2017, however we cannot access the actual test report. We do not currently have care everywhere [facility] access. I will inquire about how to obtain permission for a release  of information from these clinic sites and let Fatuma know later today should we need additional information from her.       Jeanie Lafleur MS, [facility]  Genetic Counselor  [facility]  Maternal Fetal Medicine  Ph: [phone]

## 2021-06-13 NOTE — PROGRESS NOTES
"[facility]  Genetic Counseling Consult    Patient: Fatuma Polanco YOB: 1980   Date of Service: 12/02/2020         Fatuma Polanco was evaluated via a billable telephone visit at [facility] for genetic consultation due to advanced maternal age, diamniotic dichorionic twin gestation, and a \"positive\" MTHFR genetic test. The patient's , Chris, accompanied Fatuma over the phone for today's consult.     The patient has been notified of following:    This telephone visit will be conducted via a call between you and your physician/provider. We have found that certain health care needs can be provided without the need for a physical exam. This service lets us provide the care you need with a short phone conversation. If a prescription is necessary we can send it directly to your pharmacy. If lab work is needed we can place an order for that and you can then stop by our lab to have the test done at a later time.     If during the course of the call the provider feels a telephone visit is not appropriate, you will not be charged for this service.    Impression/Plan:   1.  Fatuma elected to proceed with NIPT screening in the current twin pregnancy and will have her blood drawn at our [place] outpatient clinic after she receives her COVID-19 test results in the upcoming week. I plan to call the patient on 12/8/20 to check in with her regarding her COVID-19 status and her desired date for blood draw for NIPT. Results from NIPT screening are expected within 5-7 days after blood draw and will be available in EPIC.  We will contact her to discuss the results, and a copy will be forwarded to the office of the referring OB provider. In the event we are unable to contact the patient once results become available, she has requested we leave a call back number, alerting her that results are available for review with a genetic " "counselor. The patient wishes to know the predicted genetic sex of the di/di pregnancy and has elected to proceed with Y chromosome detection as part of her screen. The patient is aware that we will only be able to share that at least one of the twins is male if the Y chromosome is detected, and that we will not definitively know which of the twins is male, if either, via NIPT. Fatuma has requested that the predicted sex not be shared over the phone, but instead be put into an envelope that will then either be mailed to her home address or left at our  for the patient to .     2.  Maternal serum AFP (single marker screen) is recommended after 15 weeks to screen for open neural tube defects. A quad screen should not be performed.    3.  An 18-20 week comprehensive ultrasound is standard of care for all women 35 or older at delivery. Additionally, Fatuma and her  Chris report a family history of extra fingers and toes (Chris and the couple's son, Sandra). Level II ultrasound to screen for polydactyly in the current pregnancy would be recommended. Fatuma is scheduled for her twin level II ultrasound on 2020 with our clinic.     Pregnancy History:   /Parity:    Age at Delivery: 40 y.o.  CARLO: 2021, by Ultrasound  Gestational Age: 11w0d    No significant complications or exposures were reported in the current pregnancy.    Fatuma reports currently taking vitamin c, vitamin b12, vitamin b6, magnesium, fiber, probiotics, and prenatal vitamins.    Fatuma s pregnancy history is significant for:  o 2016, 2017, 2017, 2020: SAB, all occurring between 5 weeks and 8 weeks gestation. No genetic testing on products of conception was performed. The patient has not undergone a chromosome analysis, to the best of our knowledge. She reports being evaluated by \"non-traditional\" medicine specialists in light of her recurrent pregnancy losses, and has found success " "and confidence in working with a nutritionist throughout her reproductive journey. Fatuma was evaluated for variants in the MTHFR gene and for von Willebrand disease due to her history of recurrent pregnancy loss, and reports having two \"mutations\" in the MTHFR gene and testing positive for von Willebrand disease. Please see the medical history section below for additional details.   - Of note: Fatuma underwent a chromosome analysis, completed at [facility], and was identified to have a normal female karyotype: 46, XX. (Care everywhere, [facility], cytogenetics labs- resulted 9/1/2020)  o 12/2017: term, male, spontaneous vaginal delivery ([place]). He is reported to have been born with 12 fingers and 12 toes, none of which were noted prenatally on ultrasound. He is alive and well today.     Medical History:   Fatuma s reported medical history is not expected to impact pregnancy management or risks to fetal development.     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 We discussed that, like Fatuma, approximately 40% of Caucasians in [place] are carriers for a C677T variant in the MTHFR gene. In addition,10% of Caucasians are homozygous (have two copies) for the C677T variant in the MTHFR gene. There has been a significant amount of research on the role of MTHFR and its clinical significance in pregnancy. Current literature indicates that variations in the MTHFR gene do not have the strong clinical implications as originally believed.     Some of the research makes a correlation between homozygous C677T variant and neural tube defects. (Fatuma is not homozygous for the C677T variant). Many of these studies have looked at clinical cases of neural tube defects, and found elevated maternal homocysteine levels. MTHFR is important in folate metabolism; " "therefore, known, disease-causing variants may impair the ability to process folate. High homocysteine levels can be seen in individuals for a variety of reasons and mutations in the MTHFR gene have been associated with elevated levels (hyperhomocysteinemia). Although elevated homocysteine levels have been observed more frequently among women with certain pregnancy complications, such as recurrent miscarriage or preeclampsia, recent research suggests that elevated homocysteine levels may be an association of these complications rather than the actual cause. To our knowledge, there is not a significant association between MTHFR mutations and  delivery.     Regarding the combination of the C677T and E7803A polymorphisms, limited studies suuggest that MTHFR function is only at 36-60% capacity (Jairo et al. 2000; van yang Leonardo et al. 1998; Renata et al. 1998). Specifically, the K1390D polymorphism actually reduces overall enzyme activity to a lesser extent than the C677T polymorphism alone. Therefore, we would still not make pregnancy management recommendations based on Fatuma's MTHFR polymorphism status.     Furthermore, the American College of Medical Genetics and Genomics (ACMG) released a practice guideline in 2013 and recommended that \"MTHFR polymorphism genotyping should not be ordered as part of the clinical evaluation for thrombophilia or recurrent pregnancy loss\". The recommendations also included, \"MTHFR status does not change the recommendation that women of childbearing age should take the standard dose of folic acid supplementation to reduce the risk of neural tube defects as per the general population guidelines.\"     Von Willebrand Disease  Additionally, Fatuma reports she does have a personal history of von Willebrand disease. However, laboratory testing in 2017 indicated the patient's von Willebrand factor multimers distribution is within normal limits, meaning, no laboratory " evidence of von Willebrand disease was identified. (Testing was ordered at [facility] lab, completed at the [facility] in [place], [place].) Briefly, Von Willebrand disease (VWD) is a bleeding disorder that slows the blood clotting process, causing prolonged bleeding after an injury. It is estimated to affect 1 in 100 to 1 in 10,000 individuals, making it the most common genetic bleeding disorder. People with this condition often experience easy bruising, long-lasting nosebleeds, and excessive bleeding or oozing following an injury, surgery, or dental work. Women with VWD may experience reproductive tract bleeding during pregnancy and childbirth.     There are three main types of VWD, with type 2 being further divided into four subtypes. Type 1 is the mildest and most common, accounting for up to 85% of affected individuals. Type 3 is the most severe and rarest form. The subtypes of type 2 are intermediate in severity. VWD is caused by mutations in the VWF gene. People with type 1 VWD have reduced amounts of von Willebrand factor (the protein coded for by the VWF gene) in their bloodstream. There is variable penetrance and variable expressivity with type 1 VWD. People that have mutations that disrupt the function of von Willebrand factor have type 2 VWD. Type 2A VWD is believed to be fully penetrant. People with type 3 VWD typically have mutations that result in non-functional von Willebrand factor.     Most cases of VWD type 1 and type 2 are inherited in an autosomal dominant pattern. Type 2N and type 3 have autosomal recessive inheritance. We discussed that since Fatuma has not undergone molecular sequencing of her VWD gene, we do not know what type she has. Fatuma did not elected to proceed with VWF testing during today's consult. Additionally, she reports no family history of others with a known diagnosis of VWD or blood clotting disorders. Her most recent disease profile test (completed at [facility]) showed that her coagulation Factor 8 activity was within normal limits, her von Willebrand factor Ag was slightly below expected (54%, range of %), and von Willebrand factor activity was also below expected (50%, range of %). This testing was collected on 8/3/20 and completed at the [facility]. Also of note, Fatuma was tested for Factor 2 and Factor 5 mutations through [facility] and was not identified to have molecular evidence of either disease.       Family History:   A three-generation pedigree was obtained, and is scanned under the  Media  tab.     Fatuma and Chris report the following significant findings:     Chris: he is  41 years old and in good health. He reports being born with six fingers on both hands and a 6th digit on one of his feet, sharing that his extra digits were very small and not fully formed. He is otherwise in good health and has no children from previous partners.     Sandra, son: he is 2 years old today and is in good health. He was born with 12 fingers and 12 toes, none of which were identified prenatally. He is otherwise in good health today.     Mother: hysterectomy in her 30s. She has a history of heavy periods, asthma, and diverticulitis.    Maternal aunt: prophylactic hysterectomy in her 30's due to family history of ovarian cancer.     Maternal uncle: diverticulitis and history of thyroid cancer.     Maternal grandfather:  from lung cancer with a history of smoking.     Maternal grand aunts (three), sisters to patient's maternal grandfather: all three had a history of ovarian cancer.     Father: unknown medical and family history.     Chris's female first-cousin through his maternal uncle:  from a sporadic brain tumor in her 30s.     The remainder of the reported family history is negative for stillbirths, other known birth defects, intellectual diabilities, known genetic conditions, and consanguinity. Fatuma reports that  "no individuals in her family have been formally evaluated for von Willebrand disease or MTHFR polymorphisms. There are no known clotting issues in Fatuma's family, per her report.     We briefly discussed the family history of cancer. Cancer most often occurs by chance, however some families seem to develop cancer more frequently than expected. Everyone has a risk to develop cancer, but individuals may be at an increased risk to develop cancer based on their family history.We discussed that up to 20% of all cases of ovarian cancer can be associated with inherited cancer predisposition syndromes. Genetic counseling is available for cancer syndromes. Cancer family history, even without genetic testing, can change cancer screening recommendations for family members and aid in insurance coverage for access to them as well. The most informative individuals to complete cancer genetic counseling and genetic testing are those with a personal history of cancer or those closely related to the affected individuals. We reviewed that if the family wants more information they can contact the [facility] Cancer Risk Management Program ([phone]). Physicians can also make referrals at [email or web address] or, if within the [employer] system, through [facility] referral for \"Cancer Risk Mgmt/Cancer Genetic Counseling\".        Carrier Screening:   The patient reports that the father of the pregnancy has  ([place]) ancestry:     Cystic fibrosis is an autosomal recessive genetic condition that occurs with increased frequency in individuals of  ancestry and carrier screening for this condition is available.  In addition,  screening in the [facility] includes cystic fibrosis.    The patient reports that the father of the pregnancy has  (Chinese) ancestry:      The hemoglobinopathies are a group of genetic blood diseases that occur with increased " frequency in individuals of  ancestry and carrier screening for these conditions is available.  Carrier screening for the hemoglobinopathies includes a CBC with red blood cell indices, a ferritin level, and a quantitative hemoglobin electrophoresis or HPLC.  In addition,  screening in the [facility] includes many of the hemoglobinopathies.    The patient reports that the father of the pregnancy has  ancestry:      We reviewed the clinical features, autosomal recessive inheritance, and options for carrier screening and  screening for hemoglobinopathies.    The patient reports that she is of  ([facility]) ancestry:        Expanded carrier screening for mutations in a large panel of genes associated with autosomal recessive conditions including cystic fibrosis, spinal muscular atrophy, and others, is now available.      Fatmua reports she underwent carrier screening via [facility] in her previous pregnancy. She reports that she was not identified to be a carrier for any conditions screened for, and that her  Chris did not undergo screening. A copy of this result was not available for our review during today's consult.        Risk Assessment for Chromosome Conditions:   We explained that the risk for fetal chromosome abnormalities increases with maternal age. We discussed specific features of common chromosome abnormalities, including Down syndrome, trisomy 13, trisomy 18, and sex chromosome trisomies.      - At age 40 at midtrimester, the risk to have a baby with Down syndrome is 1 in 98.     - At age 40 at midtrimester, the risk to have a baby with any chromosome abnormality is 1 in 51.     - At age 40 at delivery, the risk to have a baby with Down syndrome is 1 in 137.    - At age 40 at delivery, the risk to have a baby with any chromosome abnormality is 1 in 82.     We discussed the difficulties of aneuploidy screening for twin pregnancies. Twin pregnancies are  described by the presence of absence of sharing a a placenta or amniotic sac. Monochorionic monoamniotic (mono/mono) twins share a placenta and amniotic sac. In most cases, with only rare exceptions, monochorionic twins are truly monozygotic, which means one zygote split to produce twins. Monochorionic diamniotic (mono/di) share a placenta but have separate sacs. Lastly, dichorionic diamniotic (di/di) twins have separate placentas and amniotic sacs and have an estimated 20% chance of being monozygotic.     These estimates above reflect the risk for a single conception.  In a monozygotic twin pregnancy, each twin received their DNA from the same conception, and the twins are expected to have the same complement of chromosomes.  The risk numbers above would accurately reflect the risks for a chromosome abnormality to be affecting both twins in a monozygotic twin pregnancy.  In a dizygotic twin pregnancy, each twin comes from a separate conception and has separate risks for chromosome abnormalities. In a dizygotic pregnancy, the risks above reflect the risk to each individual fetus to have a chromosome abnormality, and the overall risk for the pregnancy as a whole to have either fetus affected with a chromosome abnormality are doubled.     First trimester screening is available which includes an ultrasound and biochemical analysis of serum sample. The ultrasound provides a measure of the nuchal translucency (NT) for each twin. If these measurements are discordant, this may help in the interpretation of abnormal screening results. For monochorionic twins, an increased NT can also be a feature of twin-twin transfusion syndrome. The biochemical portion of the screen averages the analyte levels for each twin. Sensitivity of first trimester screen is decreased for twin pregnancies.     Cell-free DNA noninvasive screening (NIPT) is available for twins but results can be difficult to interpret since the fetal cell free DNA in  maternal circulation is derived from the placenta(s). If the result is abnormal, especially for dichorionic twins, it is difficult to determine which twin contributed to the abnormal result. Less validation data is available for twins and some insurance plan will not cover this screening in twin pregnancies. Additionally, NIPT for twin pregnancies does not allow for assessment of sex chromosome abnormalities (such as Woodson syndrome or Klinefelter syndrome). Analysis can be performed to detect the presence or absence of a Y chromosome. In the case of a dichorionic twin pregnancy the presence of a Y chromosome would mean that at least one twin is a male.     After a review of the risks, limitations, and benefits of screening, Fatuma elected to proceed with NIPT during today's consult. She will have her blood drawn after she receives her COVID-19 test results that are expected to be back within 48 hours of today. I will follow up with Fatuma regarding scheduling her blood draw for NIPT early the week of 12/7/20.    Testing Options:   We discussed the following options in detail during today's cosult:     Non-invasive Prenatal Testing (NIPT)    Maternal plasma cell-free DNA testing; first trimester ultrasound with nuchal translucency and nasal bone assessment is recommended, when appropriate    Screens for fetal trisomy 21, trisomy 13, trisomy 18, and sex chromosome aneuploidy    Cannot screen for open neural tube defects; maternal serum AFP after 15 weeks is recommended      Chorionic villus sampling (CVS)    Invasive procedure typically performed in the first trimester by which placental villi are obtained for the purpose of chromosome analysis and/or other prenatal genetic analysis    Diagnostic results; >99% sensitivity for fetal chromosome abnormalities    Cannot test for open neural tube defects; maternal serum AFP after 15 weeks is recommended      Genetic Amniocentesis    Invasive procedure typically  performed in the second trimester by which amniotic fluid is obtained for the purpose of chromosome analysis and/or other prenatal genetic analysis    Diagnostic results; >99% sensitivity for fetal chromosome abnormalities    AFAFP measurement tests for open neural tube defects         Comprehensive (Level II) ultrasound: Detailed ultrasound performed between 18-22 weeks gestation to screen for major birth defects and markers for aneuploidy.    We reviewed the benefits and limitations of this testing.  Screening tests provide a risk assessment specific to the pregnancy for certain fetal chromosome abnormalities, but cannot definitively diagnose or exclude a fetal chromosome abnormality.  Follow-up genetic counseling and consideration of diagnostic testing is recommended with any abnormal screening result.     Diagnostic tests carry inherent risks- including risk of miscarriage- that require careful consideration.  These tests can detect fetal chromosome abnormalities with greater than 99% certainty.  Results can be compromised by maternal cell contamination or mosaicism, and are limited by the resolution of cytogenetic G-banding technology.  There is no screening nor diagnostic test that can detect all forms of birth defects or mental disability.     It was a pleasure to be involved with Fatuma hughes care.     Total telephone call contact time: 73 minutes  Call started at: 11:00AM   Call ended at:  12:13PM      Jeanie Lafleur MS, [facility]  Genetic Counselor  [facility]  Maternal Fetal Medicine  Ph: [phone] ([facility])  Ph: [phone] ([place])

## 2021-06-13 NOTE — TELEPHONE ENCOUNTER
December 9, 2020    Called patient to confirm when she would like to come in for her NIPT blood draw now that she has received a negative COVID-19 test. Fatuma elected to have her blood drawn for NIPT screening tomorrow morning, 12/10, for her di/di twin pregnancy. We will make sure her NIPT kit will be brought over to the outpatient blood draw lab [facility] in preparation for her draw tomorrow.     In the event we are unable to contact the patient once results become available, she has requested we leave a non-specific call back number only, alerting her that results are available for review with a genetic counselor. The patient has elected to include analysis of Y chromosome material as part of her NIPT screen.       Jeanie Lafleur MS, [facility]  Genetic Counselor  [facility]  Maternal Fetal Medicine  Ph: [phone] ([facility])  Ph: [phone] ([place])

## 2021-06-14 NOTE — H&P
"HISTORY AND PHYSICAL TRIAGE EXAM    Name: Fatuma Polanco  YOB: 1980  Medical Record Number: [id number]   Prenatal CARE: Dr. Gricelda Huerta    History of Present Illness:  This is a 37-year-old  4 para 0030 female at 39 weeks 2 days.  Patient reports uterine contractions since 630 this morning.  She also reports multiple trips to the bathroom with gushes of fluid.  She reports feeling as though she is soaking a pad.  She reports good fetal movement.  She was last checked in the clinic and noted to be 1 cm dilated.    Estimated Date of Delivery: 17    EGA 39w2d    OB History    Para Term  AB Living   4 0   3 0   SAB TAB Ectopic Multiple Live Births   3          # Outcome Date GA Lbr Brock/2nd Weight Sex Delivery Anes PTL Lv   4 Current            3 SAB            2 SAB            1 SAB                    Prenatal Complications:  Advanced maternal age    Exam:      /69  Pulse 92  Temp 98.4  F (36.9  C) (Oral)   Resp 18  Ht 5' 4\" (1.626 m)  Wt 146 lb (66.2 kg)  BMI 25.06 kg/m2    Fetal heart Rate Category 1  Contractions irregular q. 2-8 minutes    HEENT  NC/AT  Heart       Lungs      Abdomen   gravid, soft and NT/ND  Extremities  no edema or calf tenderness  Vaginal exam 1cm/80/-2 per RN    Assessment: 37-year-old  4 para 0030 at 39 weeks 2 days  Rule out rupture of membranes   group B strep negative    Plan:   ROM plus collected and noted to be negative  Patient given the option to walk or to return home  Family has opted to return home and await more regular uterine contractions    Prenatal record reviewed.    Lenora Diaz DO    2017   12:32 AM            "

## 2021-06-14 NOTE — DISCHARGE SUMMARY
Patient evaluated for labor progression and possible rupture of membranes. Cervical exam was unchanged from the clinic on Monday and contractions were palpating mild and were irregular; ROM plus test was negative for rupture of membranes. Education reviewed with patient and partner for signs of active labor and when to call provider, etc. Patient encouraged to keep scheduled prenatal appointments unless labor begins before her next scheduled appointment. Patient and  verbalized understanding and denied further questions. Patient discharged to home in stable condition.

## 2021-06-14 NOTE — PROGRESS NOTES
"Please see \"Imaging\" tab under Chart Review for full report.  This ultrasound was performed in the [facility], and may be located under Care Everywhere.    Holly Mccarthy MD  Maternal Fetal Medicine    "

## 2021-06-15 ENCOUNTER — COMMUNICATION - HEALTHEAST (OUTPATIENT)
Dept: MIDWIFE SERVICES | Facility: CLINIC | Age: 41
End: 2021-06-15
Payer: COMMERCIAL

## 2021-06-15 NOTE — PROGRESS NOTES
"Please see \"Imaging\" tab under Chart Review for full report.  This ultrasound was performed in the [facility], and may be located under Care Everywhere.    Nicole Louise MD  Maternal Fetal Medicine    "

## 2021-06-16 NOTE — PROGRESS NOTES
Late entry due to patient care. PT arrival to L&D for decreased fetal movement, pt needed to use restroom, writer returned and EFM applied FH A and B obtained, monitoring continued.

## 2021-06-16 NOTE — PROGRESS NOTES
Dr. Camargo called, Dr. Camargo had been looking at EFM, Dr. Camargo to go to room and speak with pt.

## 2021-06-16 NOTE — PROGRESS NOTES
"27 week fraternal twin pregnancy. Pt came to labor and delivery c/o some cramps and lots of fetal movement. States in retrospect that she feels it most on her pregnancy ball. Has gone away now.    BP 94/52 (Patient Position: Semi-baron, Cuff Size: Adult Regular)   Pulse 98   Resp 16   Ht 5' 4\" (1.626 m)   Wt 147 lb 12.8 oz (67 kg)   LMP 09/22/2020   SpO2 97%   BMI 25.37 kg/m    Abd: soft, NT  Ext: NT    Rare UC, FHTs reactive x 2    A; ligament pain, now gone    P: will check UA, warnings given  "

## 2021-06-16 NOTE — PROGRESS NOTES
27 week and 3 days multip [place] [facility] [place]  complaining of uterine tightening and lower back pain. She stated the babies felt very active today and she just started her third trimester of pregnancy. Pt stated today she felt increased lower back pain and tightening today that was hard to resolve. Denies leaking fluid and/or bleeding.     MD notified of pts arrival and rounded on pt.    Plan to monitor babies, watch for contractions/tightening/pain and collect a urine to be sent for a UA.    MD notified of...   -UA results, elevated glucose in urine, no new orders; just follow up at next clinic appointment.  -EFM difficult to trace 27 week twins. MD updated with active twins and positive fetal movement. Okay with EFM that was traced.  -No contractions tracing. Pt states she is feeling no pain/tightening/contractions. All pain has resolved since resting on hospital bed and drinking fluids (water, apple juice, and crackers.)  -Pt's blood pressure was low. MD explained that low BP probably due to twin pregnancy heading into third trimester.     Plan:  -Administer 100mg of Vistaril  -Discharge pt home with     COLLETTE CamachoN, RN  Pt discharged at 6011

## 2021-06-16 NOTE — TELEPHONE ENCOUNTER
Fatuma calls and says that she is 27 weeks pregnant with twins and says that her Dr. Wants her to go to [facility] L & D floor. RN checked Epic and did not see any notes about this. Fatuma says that she wants to know if that floor is still accepting pts. RN then called that floor and spoke to Anastasia (nurse). RN told Anastasia what pt. Said and then conferenced pt. With Anastasia, for further assistance. Annita Spencer RN FNA  COVID 19 Nurse Triage Plan/Patient Instructions    Please be aware that novel coronavirus (COVID-19) may be circulating in the community. If you develop symptoms such as fever, cough, or SOB or if you have concerns about the presence of another infection including coronavirus (COVID-19), please contact your health care provider or visit  [email or web address].    Disposition/Instructions    Home care recommended. Follow home care protocol based instructions.    Thank you for taking steps to prevent the spread of this virus.  o Limit your contact with others.  o Wear a simple mask to cover your cough.  o Wash your hands well and often.    Resources    [facility]: About COVID-19: [email or web address]    CDC: What to Do If You're Sick: www.cdc.gov/coronavirus/2019-ncov/about/steps-when-sick.html    CDC: Ending Home Isolation: www.cdc.gov/coronavirus/2019-ncov/hcp/disposition-in-home-patients.html     CDC: Caring for Someone: www.cdc.gov/coronavirus/2019-ncov/if-you-are-sick/care-for-someone.html     [facility]: Interim Guidance for Hospital Discharge to Home: www.health.[facility].[place]/diseases/coronavirus/hcp/hospdischarge.pdf    [facility] clinical trials (COVID-19 research studies): [email or web address]     Below are the COVID-19 hotlines at the [place] Department of Health ([facility]). Interpreters are available.   o For health questions: Call 220-305-5714 or 1-490.900.6053 (7 a.m. to 7 p.m.)  o For questions about schools and childcare:  Call 329-766-5877 or 1-103.633.9591 (7 a.m. to 7 p.m.)       Additional Information    Negative: [1] Caller is not with the adult (patient) AND [2] reporting urgent symptoms    Negative: Lab result questions    Negative: Medication questions    Negative: Caller can't be reached by phone    Negative: Caller has already spoken to PCP or another triager    Negative: RN needs further essential information from caller in order to complete triage    Negative: Requesting regular office appointment    Negative: [1] Caller requesting NON-URGENT health information AND [2] PCP's office is the best resource    Negative: Health Information question, no triage required and triager able to answer question    General information question, no triage required and triager able to answer question    Protocols used: INFORMATION ONLY CALL-A-AH

## 2021-06-16 NOTE — PROGRESS NOTES
Discharge AVS reviewed with pt, pt verbalizes understanding of discharge AVS and pt discharged home.

## 2021-06-16 NOTE — PROGRESS NOTES
"Please see \"Imaging\" tab under Chart Review for full report.  This ultrasound was performed in the [facility], and may be located under Care Everywhere.    Nicole Louise MD  Maternal Fetal Medicine    "

## 2021-06-16 NOTE — PROGRESS NOTES
Dr. Camargo visited with patient at bedside, reviewed with PT EFM and plan for follow up care.  PT to be discharged home.

## 2021-06-17 NOTE — ANESTHESIA CARE TRANSFER NOTE
Last vitals:   Vitals:    05/20/21 0021   BP: 111/75   Pulse: 77   Resp: 16   Temp:    SpO2: 100%     Patient's level of consciousness is awake  Spontaneous respirations: yes  Maintains airway independently: yes  Dentition unchanged: yes  Oropharynx: oropharynx clear of all foreign objects    QCDR Measures:  ASA# 20 - Surgical Safety Checklist: WHO surgical safety checklist completed prior to induction    PQRS# 430 - Adult PONV Prevention: NA - Not adult patient, not GA or 3 or more risk factors NOT present  ASA# 8 - Peds PONV Prevention: NA - Not pediatric patient, not GA or 2 or more risk factors NOT present  PQRS# 424 - Sharla-op Temp Management: 4559F - At least one body temp DOCUMENTED => 35.5C or 95.9F within required timeframe  PQRS# 426 - PACU Transfer Protocol: - Transfer of care checklist used  ASA# 14 - Acute Post-op Pain: ASA14B - Patient did NOT experience pain >= 7 out of 10

## 2021-06-17 NOTE — NURSING NOTE
NST performed due to growth restriction of twin A.   reviewed efm tracing. See NST/BPP Doc Flowsheet tab.

## 2021-06-17 NOTE — ANESTHESIA PROCEDURE NOTES
Epidural Block          Additional Notes:  SEE PREVIOUS BLOCK NOTE, this is created for Epic charting purposes only

## 2021-06-17 NOTE — PROGRESS NOTES
"Please see \"Imaging\" tab under Chart Review for full report.  This ultrasound was performed in the [facility], and may be located under Care Everywhere.    Nicole Louise MD  Maternal Fetal Medicine    "

## 2021-06-17 NOTE — NURSING NOTE
33w1d here at [facility]. NST performed due to fetal growth restriction fetus 1.  Dr. Mejia reviewed efm tracing. See NST/BPP Doc Flowsheet tab.

## 2021-06-17 NOTE — PROGRESS NOTES
NST performed due to new diagnosis of FGR.  Dr. House reviewed efm tracing. See NST/BPP Doc Flowsheet tab.

## 2021-06-17 NOTE — PROGRESS NOTES
"Please see \"Imaging\" tab under \"Chart Review\" for details of today's visit.    Mal Mejia        "

## 2021-06-17 NOTE — NURSING NOTE
NST performed due to growth restriction of fetus A.   reviewed efm tracing. See NST/BPP Doc Flowsheet tab.

## 2021-06-17 NOTE — ANESTHESIA PREPROCEDURE EVALUATION
Anesthesia Evaluation      Patient summary reviewed     Airway   Mallampati: II  Neck ROM: full   Pulmonary - normal exam   (+) asthma                           Cardiovascular - negative ROS  Exercise tolerance: > or = 4 METS  Rhythm: regular  Rate: normal,      ROS comment: Von willebrand's disease     Neuro/Psych    (+) anxiety/panic attacks,     Endo/Other - negative ROS      GI/Hepatic/Renal - negative ROS      Other findings:  now in induction for twins   Factor 8 activity 149%      Dental - normal exam                          Anesthesia Plan  Planned anesthetic: epidural  Standby for twin delivery  ASA 3 - emergent     Anesthetic plan and risks discussed with: patient    Post-op plan: routine recovery

## 2021-06-17 NOTE — TELEPHONE ENCOUNTER
Telephone Encounter by Cheri Martinez LPN at 2020 11:21 AM     Author: Cheri Martinez LPN Service: -- Author Type: Licensed Nurse    Filed: 2020  2:48 PM Encounter Date: 2020 Status: Addendum    : Liv Reynoso APRN, CNM (Midwife)    Related Notes: Original Note by Liv Reynoso APRN, CNM (Midwife) filed at 2020  2:47 PM       Liv Reynoso APRN, CNM   to Me            20 10:46 AM   It looks like she was declined care with CNM because of Von Willbrand disease. She needs to seek care from an OB GYN. She say [facility] last time. Can you please call her to let her know that? Thanks, LOLI Paiz DNP, CNM     After speaking with pt on phone today she states she had a  with [facility] in 2017, her OB has since retired and she is hoping to get care from a midwife this pregnancy. She says she was tested for Von Willabrands after last pregnancy and tested negative so there is some question regarding initial diagnosis. Offered her the option to schedule a pregnancy confirmation visit and stongly encouraged her to bring all hematology and pregnancy (2017) records for the midwife to review before we can give her a final decision of whether or not we can care for her during pregnancy/. LMP early  so will make appt in approx 2 weeks so we can order ultrasound to confirm dating after 6 weeks.

## 2021-06-17 NOTE — ANESTHESIA PREPROCEDURE EVALUATION
Anesthesia Evaluation      Patient summary reviewed     Airway   Mallampati: II  Neck ROM: full   Pulmonary - normal exam   (+) asthma                           Cardiovascular - negative ROS  Exercise tolerance: > or = 4 METS  Rhythm: regular  Rate: normal,      ROS comment: Von willebrand's disease     Neuro/Psych    (+) anxiety/panic attacks,     Endo/Other - negative ROS      GI/Hepatic/Renal - negative ROS      Other findings:  now in induction for twins   Factor 8 activity 149%      Dental - normal exam                        Anesthesia Plan  Planned anesthetic: epidural    ASA 3     Anesthetic plan and risks discussed with: patient    Post-op plan: routine recovery

## 2021-06-17 NOTE — PROGRESS NOTES
"Please see the \"Imaging\" tab for details of today's ultrasound.    Janet House MD  Specialist in Maternal-Fetal Medicine    "

## 2021-06-17 NOTE — ANESTHESIA PROCEDURE NOTES
Epidural Block    Patient location during procedure: OB  Time Called: 5/19/2021 2:48 PM  Reason for Block:labor epidural  Staffing:  Performing  Anesthesiologist: Makayla Acosta MD  Preanesthetic Checklist  Completed: patient identified, risks, benefits, and alternatives discussed, timeout performed, consent obtained, at patient's request, airway assessed, oxygen available, suction available, emergency drugs available and hand hygiene performed  Procedure  Patient position: sitting  Prep: ChloraPrep  Patient monitoring: continuous pulse oximetry, heart rate and blood pressure  Approach: midline  Location: L4-L5  Injection technique: JAYY saline  Number of Attempts:1  Needle  Needle type: Polar OLEDole   Needle gauge: 18 G     Catheter in Space: 5  Assessment  Sensory level: T10  No complications      Additional Notes:  Facile placement, single attempt, single pass. Patient tolerated well and without complication.

## 2021-06-17 NOTE — ANESTHESIA POSTPROCEDURE EVALUATION
Patient: Fatuma Polanco  Procedure(s):  PLACE ON STANDBY, SURGERY TEAM, FOR  SECTION  Anesthesia type: No value filed.    Patient location: Labor and Delivery  Last vitals: No vitals data found for the desired time range.    Post vital signs: stable  Level of consciousness: awake and responds to simple questions  Post-anesthesia pain: pain controlled  Post-anesthesia nausea and vomiting: no  Pulmonary: unassisted, return to baseline  Cardiovascular: stable and blood pressure at baseline  Hydration: adequate  Anesthetic events: no    QCDR Measures:  ASA# 11 - Sharla-op Cardiac Arrest: ASA11B - Patient did NOT experience unanticipated cardiac arrest  ASA# 12 - Sharla-op Mortality Rate: ASA12B - Patient did NOT die  ASA# 13 - PACU Re-Intubation Rate: NA - No ETT / LMA used for case  ASA# 10 - Composite Anes Safety: ASA10A - No serious adverse event    Additional Notes:

## 2021-06-17 NOTE — PROGRESS NOTES
"Please see \"Imaging\" tab under Chart Review for full report.  This ultrasound was performed in the [facility], and may be located under Care Everywhere.    Nicole Louise MD  Maternal Fetal Medicine    "

## 2021-06-17 NOTE — ANESTHESIA POSTPROCEDURE EVALUATION
Patient: Fatuma Polanco  * No procedures listed *  Anesthesia type: epidural    Patient location: Labor and Delivery  Last vitals: No vitals data found for the desired time range.    Post vital signs: stable  Level of consciousness: awake and responds to simple questions  Post-anesthesia pain: pain controlled  Post-anesthesia nausea and vomiting: no  Pulmonary: unassisted, return to baseline  Cardiovascular: stable and blood pressure at baseline  Hydration: adequate  Anesthetic events: no    QCDR Measures:  ASA# 11 - Sharla-op Cardiac Arrest: ASA11B - Patient did NOT experience unanticipated cardiac arrest  ASA# 12 - Sharla-op Mortality Rate: ASA12B - Patient did NOT die  ASA# 13 - PACU Re-Intubation Rate: NA - No ETT / LMA used for case  ASA# 10 - Composite Anes Safety: ASA10A - No serious adverse event    Additional Notes:

## 2021-06-25 NOTE — TELEPHONE ENCOUNTER
Pt has twins that just left NICU on 6/6 and she was scheduled for a lactation appt for 6/10 but could not find anyone to come with her to help so she re scheduled for Tuesday 6/15. In the meantime she is wondering if she can get a call from lactation she has just a few questions to get her by till she comes in for her appt.

## 2021-06-25 NOTE — TELEPHONE ENCOUNTER
Attempted to return Anastasiya's call--no answer.  Left message that I will try again later today.

## 2021-06-25 NOTE — TELEPHONE ENCOUNTER
Pt has twins in the NICU at [facility] - please call her to help with questions, she has a tentatively scheduled appt for 6/10 with AO at [facility] 6[facility]10 which is her first opening.

## 2021-06-25 NOTE — TELEPHONE ENCOUNTER
"Returned Anastasiya's call.  She is breastfeeding her late- twins, both of whom remain in NICU.  She does anticipate baby girl being discharged tomorrow, and baby boy within a few days. She is breastfeeding the babies directly while visiting them in the hospital, using a nipple shield.  Discussed that this is common until term age. They are doing well, and needing just about 1 oz of supplement with pumped milk after nursing.  Normally take about 2 oz from bottle when not nursing.   Her questions are primarily about pumping, such as how often she needs to pump to supply babies, and how to manage nipple pain.   Anastasiya is currently pumping about every 3 hours when not with her babies, yielding about 5.5 oz/session.  Her nipple pain when pumping was somewhat relieved by moving to larger flange size, but is continuing to have some discomfort.  Notices bits of \"coagulated milk\" on her nipples, but this does not wash off.  Pumping is most painful when using the hospital-grade pump;  Better when using her Medela home pump.    P:  Advised pumping around 5-6 times/day, as this should meet babies' current supplementation needs of about 15 oz each.  Discussed that pumping sessions do not need to be precisely evenly spaced. Suggested that nipple pain and white areas on nipples could be milk blebs;  discussed physiology of milk blebs and relation to pain.  Suggested warm moist heat to nipples before pumping or feeding, and keeping nipples continuously moist with olive oil between feedings.  Also can try lubricating pump flange for increased comfort.  Anastasiya indicates understanding, and plans follow-up lactation visit outpatient next week.  "

## 2021-06-25 NOTE — TELEPHONE ENCOUNTER
Returned Anastasiya's call.  Twins are home from NICU, and she has been overwhelmed with workload of two babies, pumping, supplementing and putting babies to breast.  For this reason she has stopped putting babies directly to breast for about 3 days, and was been exclusively pumping.  Anastasiya would like to resume direct breastfeeding (tried today) but is concerned about how to also incorporate pumping, as babies need two bottles/day of fortified breastmilk.  She is considering doing one feeding of direct nursing alternated with one feeding of pumped milk, or doing direct breastfeeding during the day and bottlefeeding at night.  Babies are doing well, gaining weight.  Anastasiya is pumping every 3 hours, and had been yielding about 5 oz/session, but since moving to exclusive pumping yield has decreased to 2-4 oz/session.  Babies usually take about 2 oz at bottlefeeding.  She is continuing to use a nipple shield and nurse babies separately rather than tandem until babies reach term age, as advised by pediatrician.  Wondering how much/how often to pump and how to work schedule of pumping/feeding twins.    A: Mother breastfeeding  twins;  Concerns about time management and milk supply    P:  Discussed that if each baby normally takes about 2 oz/feeding, and she chooses to alternate direct nursing with bottlefeedings, likely would need to pump about 20 oz/day in addition to nursing (assuming no formula supplementation is desired).  Suggested pumping each time babies have a bottlefeeding, and could also consider use of passive pump during feedings to stimulate supply and gather more milk with decreased effort.  Anastasiya expresses understanding, and will follow up with in-person visit next week.

## 2021-07-03 NOTE — ADDENDUM NOTE
Addendum Note by Constantine Walker, Genetic Counselor at 12/9/2020  1:27 PM     Author: Constantine Walker, Genetic Counselor Service: -- Author Type: Genetic Counselor    Filed: 12/9/2020  1:27 PM Encounter Date: 12/9/2020 Status: Signed    : Constantine Walker, Genetic Counselor (Genetic Counselor)    Addended by: CONSTANTINE WALKER on: 12/9/2020 01:27 PM        Modules accepted: Orders

## 2021-07-03 NOTE — ADDENDUM NOTE
Addendum Note by Rusty Serra MD at 1/22/2021 10:00 AM     Author: Rusty Serra MD Service: -- Author Type: Physician    Filed: 1/22/2021  2:31 PM Encounter Date: 1/22/2021 Status: Signed    : Rusty Serra MD (Physician)    Addended by: RUSTY SERRA on: 1/22/2021 02:31 PM        Modules accepted: Level of Service

## 2021-07-14 ENCOUNTER — DOCUMENTATION ONLY (OUTPATIENT)
Dept: OTHER | Facility: CLINIC | Age: 41
End: 2021-07-14

## 2021-07-14 PROBLEM — O26.20 HABITUAL ABORTER, ANTEPARTUM: Status: RESOLVED | Noted: 2021-02-26 | Resolved: 2021-06-09

## 2021-09-25 ENCOUNTER — HEALTH MAINTENANCE LETTER (OUTPATIENT)
Age: 41
End: 2021-09-25

## 2021-12-03 NOTE — TELEPHONE ENCOUNTER
RECORDS RECEIVED FROM: Care Everywhere   Appt Date: 12.06.2021    NOTES STATUS DETAILS   OFFICE NOTE from referring provider N/A    OFFICE NOTES from other specialists In process    DISCHARGE SUMMARY from hospital N/A    MEDICATION LIST Care Everywhere    LIVER BIOSPY (IF APPLICABLE)      PATHOLOGY REPORTS  N/A    IMAGING     ENDOSCOPY (IF AVAILABLE) N/A    COLONOSCOPY (IF AVAILABLE) N/A    ULTRASOUND LIVER N/A    CT OF ABDOMEN N/A    MRI OF LIVER N/A    FIBROSCAN, US ELASTOGRAPHY, FIBROSIS SCAN, MR ELASTOGRAPHY N/A    LABS     HEPATIC PANEL (LIVER PANEL) Internal 11.18.2021   BASIC METABOLIC PANEL N/A    COMPLETE METABOLIC PANEL Car e Everywhere 11.18.2021   COMPLETE BLOOD COUNT (CBC) Internal 11.18.2021   INTERNATIONAL NORMALIZED RATIO (INR) N/A    HEPATITIS C ANTIBODY N/A    HEPATITIS C VIRAL LOAD/PCR N/A    HEPATITIS C GENOTYPE N/A    HEPATITIS B SURFACE ANTIGEN N/A    HEPATITIS B SURFACE ANTIBODY N/A    HEPATITIS B DNA QUANT LEVEL N/A    HEPATITIS B CORE ANTIBODY N/A

## 2021-12-06 ENCOUNTER — LAB (OUTPATIENT)
Dept: LAB | Facility: CLINIC | Age: 41
End: 2021-12-06
Payer: COMMERCIAL

## 2021-12-06 ENCOUNTER — OFFICE VISIT (OUTPATIENT)
Dept: GASTROENTEROLOGY | Facility: CLINIC | Age: 41
End: 2021-12-06
Attending: PHYSICIAN ASSISTANT
Payer: COMMERCIAL

## 2021-12-06 ENCOUNTER — PRE VISIT (OUTPATIENT)
Dept: GASTROENTEROLOGY | Facility: CLINIC | Age: 41
End: 2021-12-06
Payer: COMMERCIAL

## 2021-12-06 VITALS
BODY MASS INDEX: 26.75 KG/M2 | HEIGHT: 61 IN | OXYGEN SATURATION: 96 % | HEART RATE: 72 BPM | WEIGHT: 141.7 LBS | DIASTOLIC BLOOD PRESSURE: 68 MMHG | SYSTOLIC BLOOD PRESSURE: 103 MMHG | RESPIRATION RATE: 16 BRPM | TEMPERATURE: 98.6 F

## 2021-12-06 DIAGNOSIS — R19.8 PAIN WITH BOWEL MOVEMENTS: Primary | ICD-10-CM

## 2021-12-06 DIAGNOSIS — R74.8 ELEVATED LIVER ENZYMES: Primary | ICD-10-CM

## 2021-12-06 DIAGNOSIS — R74.8 ELEVATED LIVER ENZYMES: ICD-10-CM

## 2021-12-06 LAB
ALBUMIN SERPL-MCNC: 4 G/DL (ref 3.4–5)
ALP SERPL-CCNC: 58 U/L (ref 40–150)
ALT SERPL W P-5'-P-CCNC: 23 U/L (ref 0–50)
ANION GAP SERPL CALCULATED.3IONS-SCNC: 5 MMOL/L (ref 3–14)
AST SERPL W P-5'-P-CCNC: 11 U/L (ref 0–45)
BILIRUB DIRECT SERPL-MCNC: <0.1 MG/DL (ref 0–0.2)
BILIRUB SERPL-MCNC: 0.4 MG/DL (ref 0.2–1.3)
BUN SERPL-MCNC: 12 MG/DL (ref 7–30)
CALCIUM SERPL-MCNC: 8.9 MG/DL (ref 8.5–10.1)
CHLORIDE BLD-SCNC: 109 MMOL/L (ref 94–109)
CO2 SERPL-SCNC: 27 MMOL/L (ref 20–32)
CREAT SERPL-MCNC: 0.7 MG/DL (ref 0.52–1.04)
ERYTHROCYTE [DISTWIDTH] IN BLOOD BY AUTOMATED COUNT: 13.4 % (ref 10–15)
GFR SERPL CREATININE-BSD FRML MDRD: >90 ML/MIN/1.73M2
GLUCOSE BLD-MCNC: 101 MG/DL (ref 70–99)
HCT VFR BLD AUTO: 38.7 % (ref 35–47)
HGB BLD-MCNC: 12.6 G/DL (ref 11.7–15.7)
INR PPP: 0.94 (ref 0.85–1.15)
MCH RBC QN AUTO: 29.4 PG (ref 26.5–33)
MCHC RBC AUTO-ENTMCNC: 32.6 G/DL (ref 31.5–36.5)
MCV RBC AUTO: 90 FL (ref 78–100)
PLATELET # BLD AUTO: 335 10E3/UL (ref 150–450)
POTASSIUM BLD-SCNC: 4 MMOL/L (ref 3.4–5.3)
PROT SERPL-MCNC: 7.6 G/DL (ref 6.8–8.8)
RBC # BLD AUTO: 4.28 10E6/UL (ref 3.8–5.2)
SODIUM SERPL-SCNC: 141 MMOL/L (ref 133–144)
WBC # BLD AUTO: 6.9 10E3/UL (ref 4–11)

## 2021-12-06 PROCEDURE — 82248 BILIRUBIN DIRECT: CPT | Performed by: PATHOLOGY

## 2021-12-06 PROCEDURE — 36415 COLL VENOUS BLD VENIPUNCTURE: CPT | Performed by: PATHOLOGY

## 2021-12-06 PROCEDURE — 99204 OFFICE O/P NEW MOD 45 MIN: CPT | Performed by: PHYSICIAN ASSISTANT

## 2021-12-06 PROCEDURE — 85610 PROTHROMBIN TIME: CPT | Performed by: PATHOLOGY

## 2021-12-06 PROCEDURE — G0463 HOSPITAL OUTPT CLINIC VISIT: HCPCS

## 2021-12-06 PROCEDURE — 80053 COMPREHEN METABOLIC PANEL: CPT | Performed by: PATHOLOGY

## 2021-12-06 PROCEDURE — 85027 COMPLETE CBC AUTOMATED: CPT | Performed by: PATHOLOGY

## 2021-12-06 RX ORDER — BUDESONIDE AND FORMOTEROL FUMARATE DIHYDRATE 160; 4.5 UG/1; UG/1
1 AEROSOL RESPIRATORY (INHALATION) 2 TIMES DAILY
COMMUNITY

## 2021-12-06 RX ORDER — FOLIC ACID 0.8 MG
500 TABLET ORAL DAILY
COMMUNITY

## 2021-12-06 ASSESSMENT — MIFFLIN-ST. JEOR: SCORE: 1246.12

## 2021-12-06 ASSESSMENT — PAIN SCALES - GENERAL: PAINLEVEL: NO PAIN (0)

## 2021-12-06 NOTE — LETTER
12/6/2021     RE: Fatuma Polanco  [address] [facility] [address]    Dear Colleague,    Thank you for referring your patient, Fatuma Polnaco, to the [facility] [place]. Please see a copy of my visit note below.    Hepatology Clinic note  Fatuma Polanco   Date of Birth 1980  Date of Service 12/6/2021    REASON FOR CONSULTATION: Evaluate liver  REFERRING PROVIDER: Self-referred         Assessment/plan:   Fatuma Polanco is a 41 year old female with history with persistent symptoms of headaches, dizziness as well as lower abdomen symptoms.     Recent and historical LFT's have been normal. Liver function is otherwise normal without stigmata of advanced liver disease. Given clinical history, symptoms and labs, do not think any liver disease contributing. Do not think any further hepatobiliary work-up is indicated.    She does have some GI symptoms including lower abdominal pain, constipation with tenemus and pain with defecation. Discussed consideration CRS evaluation with Pelvic  given symptoms and history of pelvic trauma/misscarriages.     # Lower abdominal pain / pain with bowel movements / history of pelvic floor problems:   - Consider abdominal imaging   - Consider Pelvic Floor Referral (w/ CRS specialist)   - Continue optimization of mental health     # Follow-up in Hepatology clinic in clinic as needed     Manny Paulino PA-C   [facility]     -----------------------------------------------------       HPI:   Fatuma Polanco is a 41 year old female  presenting for the evaluation of her liver function.     Hospitalized with dizziness and headaches.  Work-up halted during pregnancy last year,.Had twins, induced at 35 weeks.  She was recommend to have her liver assessed in the setting of multiple symptoms including headaches, dizziness and abdominal bloating.     Per outside labs, LFT's have historically been normal.    Recent ALT 14, AST 15. No previous problems with liver.     Appetite is okay. Seeing a nutritionist. Didn't notice any changes with gluten free diet. Eat a variety a foods, fruits/vegetables.     Previously 117 lbs, Gained about 40 lbs during pregnancy. Currently 140 lbs. Having a lot cramping of abdominal, towards the end of the day. Does feel like have to have a bowel movement. Trying to work on not straining to have bowel movements. This has been doing on for the past few yeras. Usually have bowel movements 2-3 times a week, which are painful. See  PT for pelvic floor and does some exercises.     History four miscarriages/other trauma. Headaches happening over past few yeras. In regards to mental health evaluation and therapy, she has EMDR, DBT and currently therapist weekly.     Patient denies jaundice, lower extremity edema,  or confusion.  Patient also denies melena, hematochezia or hematemesis. Patient denies weight loss, fevers, sweats or chills. Has lot of sweats around menstrual period.     PMH: VWD, headaches, dizziness, anxiety, TMJ, ADHD, OCPD, PTSD, asthma, plantar fascitis     SMH: Colpo - high grade dysplaisa. S/p LEEP, history lung collapse s/p chest tube 1996, tonsillectomy, adenoidectomy     Medications: See below     No previous tobacco use. No history of significant alcohol use. No previous IV/IN drug use. Patient has 6 month old twins and four year old. No known family history of liver disease or liver labs.  Dad had alcohol overdose.     Previous work-up:  HCV antibody nonreactive  HBV SAb:   HBV SAg: nonreactive  HBV CAb:   HIV:  BECK:  F-actin  AMA:  Ferritin: 47.4  Iron Sats: 25%   TTG - normal   Alpha-1-antripsin  Ceruloplasmin   TSH 2.88   Cholesterol Total 185  HDL 52     Triglycerides 58  Hemoglobin A1c    Medical hx Surgical hx   Past Medical History:   Diagnosis Date     Abnormal Pap smear of cervix      Acute eczema      Anxiety      Asthma      Blood dyscrasia      Cervical  dysplasia 2009     ELVA II (cervical intraepithelial neoplasia II) 7/09     Endometriosis 2009     Mental disorder      MTHFR mutation (H) 2020     Unspecified asthma(493.90)      Von Willebrand disease (H)     Past Surgical History:   Procedure Laterality Date     BIOPSY CERVICAL, LOCAL EXCISION, SINGLE/MULTIPLE  2009     C RAD RESEC TONSIL/PILLARS  1986    and adenoids     LEEP TX, CERVICAL  10/09    ELVA I, had hx of ELVA II on colp     MOUTH SURGERY  1993     OTHER SURGICAL HISTORY  2011    DENTOALVEOLAR  STRUCTURES     OTHER SURGICAL HISTORY  1996    LUNG REINFLATION SURGERY X2       TONSILLECTOMY & ADENOIDECTOMY  1985     Z NONSPECIFIC PROCEDURE  1995    ortho     [facility] NONSPECIFIC PROCEDURE  05/96    lung collapsed                 Medications:     Current Outpatient Medications   Medication     Albuterol Sulfate (PROAIR HFA) 108 (90 BASE) MCG/ACT AERS     fluticasone (FLONASE) 50 MCG/ACT nasal spray     Prenatal Multivit-Min-Fe-FA (PRENATAL VITAMINS) 0.8 MG TABS     No current facility-administered medications for this visit.            Allergies:     Allergies   Allergen Reactions     Alcohol Shortness Of Breath, Hives and Rash     Sulfates, vodka, rum  *NOT ALCOHOL WIPES FOR INJECTIONS*     Animal Dander      Dust Mites Cough     Causes asthma sxs     Mold      Seasonal Allergies             Social History:     Social History     Socioeconomic History     Marital status:      Spouse name: Not on file     Number of children: 0     Years of education: Not on file     Highest education level: Not on file   Occupational History     Occupation:    Tobacco Use     Smoking status: Never Smoker     Smokeless tobacco: Never Used   Substance and Sexual Activity     Alcohol use: No     Comment: very rarely     Drug use: No     Sexual activity: Yes     Partners: Male     Birth control/protection: Condom, None   Other Topics Concern     Not on file   Social History Narrative    Dairy/d 3  servings/d.     Caffeine 0-1 servings/d    Exercise 5 x week    Sunscreen used - Yes    Seatbelts used - Yes    Working smoke/CO detectors in the home - Yes    Guns stored in the home - No    Self Breast Exams - Yes    Eye Exam up to date - No    Dental Exam up to date - Yes    Pap Smear up to date - Yes    Mammogram up to date - NOT APPLICABLE    Dexa Scan up to date - NOT APPLICABLE    Flex Sig / Colonoscopy up to date - NOT APPLICABLE    Immunizations up to date - Yes    Abuse: Current or Past(Physical, Sexual or Emotional)- No    Do you feel safe in your environment - Yes        Ranjan Allen CMA                     Social Determinants of Health     Financial Resource Strain: Not on file   Food Insecurity: Not on file   Transportation Needs: Not on file   Physical Activity: Not on file   Stress: Not on file   Social Connections: Not on file   Intimate Partner Violence: Not on file   Housing Stability: Not on file            Family History:     Family History   Problem Relation Age of Onset     Hypertension Mother      Hypertension Father      Cerebrovascular Disease Maternal Grandmother      Cancer - colorectal Maternal Grandfather      Alcohol/Drug Father         Alcohol     Genitourinary Problems Sister      Lipids Mother      Hyperlipidemia Mother      Hyperlipidemia Father      Heart Disease Maternal Grandmother 94.00     Lung Cancer Maternal Grandfather               Review of Systems:   Gen: See HPI     HEENT: No change in vision or hearing, mouth sores, dysphagia, lymph nodes  Resp: No shortness of breath, coughing, hx of asthma  CV: No chest pain, palpitations, syncope   GI: See HPI  : No dysuria, history of stones, urine color    Skin: No rash; no pruritus or psoriasis  MS: No arthralgias, myalgias, joint swelling  Neuro: No memory changes, confusion, numbness    Heme: No difficulty clotting, bruising, bleeding  Psych:  No anxiety, depression, agitation          Physical Exam:   VS: /68 (BP  "Location: Right arm, Patient Position: Sitting, Cuff Size: Adult Regular)   Pulse 72   Temp 98.6  F (37  C) (Oral)   Resp 16   Ht 1.543 m (5' 0.75\")   Wt 64.3 kg (141 lb 11.2 oz)   SpO2 96%   BMI 27.00 kg/m      Gen: A&Ox3, NAD, well developed  HEENT: non-icteric   Lung: CTA Bilatererally, no wheezing or crackles.   Lym- no palpable lymphadenopathy  Abd: soft, NT, ND, no palpable splenomegaly, liver is not palpable.   Ext: no edema, intact pulses.   Skin: No rash, no palmar erythema, telangiectasias or jaundice  Neuro: grossly intact, no asterixis   Psych: appropriate mood and affects         Data:   Reviewed in person and significant for:    Lab Results   Component Value Date     12/06/2021     02/11/2011      Lab Results   Component Value Date    POTASSIUM 4.0 12/06/2021    POTASSIUM 3.8 02/11/2011     Lab Results   Component Value Date    CHLORIDE 109 12/06/2021    CHLORIDE 104 02/11/2011     Lab Results   Component Value Date    CO2 27 12/06/2021    CO2 23 02/11/2011     Lab Results   Component Value Date    BUN 12 12/06/2021    BUN 12 02/11/2011     Lab Results   Component Value Date    CR 0.70 12/06/2021    CR 0.61 02/11/2011       Lab Results   Component Value Date    WBC 6.9 12/06/2021    WBC 4.4 09/21/2012     Lab Results   Component Value Date    HGB 12.6 12/06/2021    HGB 13.7 09/21/2012     Lab Results   Component Value Date    HCT 38.7 12/06/2021    HCT 41.1 09/21/2012     Lab Results   Component Value Date    MCV 90 12/06/2021    MCV 92 09/21/2012     Lab Results   Component Value Date     12/06/2021     09/21/2012       Lab Results   Component Value Date    AST 11 12/06/2021    AST 28 02/11/2011     Lab Results   Component Value Date    ALT 23 12/06/2021    ALT 13 02/11/2011     No results found for: BILICONJ   Lab Results   Component Value Date    BILITOTAL 0.4 12/06/2021    BILITOTAL 0.2 02/11/2011       Lab Results   Component Value Date    ALBUMIN 4.0 12/06/2021    " ALBUMIN 4.6 02/11/2011     Lab Results   Component Value Date    PROTTOTAL 7.6 12/06/2021    PROTTOTAL 7.6 02/11/2011      Lab Results   Component Value Date    ALKPHOS 58 12/06/2021    ALKPHOS 52 02/11/2011       Lab Results   Component Value Date    INR 0.94 12/06/2021    INR 0.94 02/11/2011         Imaging:      No recent abdominal imaging       Again, thank you for allowing me to participate in the care of your patient.        Sincerely,        Manny Paulino PA-C

## 2021-12-06 NOTE — NURSING NOTE
"Chief Complaint   Patient presents with     Consult     Headaches, Dizziness (was hospitalized in 2020 for this)     Complains of \"digestive pain\"    Get's dizziness and headaches towards the end of the day.     Vital signs:  Temp: 98.6  F (37  C) Temp src: Oral BP: 103/68 Pulse: 72   Resp: 16 SpO2: 96 %     Height: 154.3 cm (5' 0.75\") Weight: 64.3 kg (141 lb 11.2 oz)  Estimated body mass index is 27 kg/m  as calculated from the following:    Height as of this encounter: 1.543 m (5' 0.75\").    Weight as of this encounter: 64.3 kg (141 lb 11.2 oz).        Bhargavi Mejia, [facility]  12/6/2021 2:48 PM      "

## 2021-12-06 NOTE — PROGRESS NOTES
Hepatology Clinic note  Fatuma Polanco   Date of Birth 1980  Date of Service 12/6/2021    REASON FOR CONSULTATION: Evaluate liver  REFERRING PROVIDER: Self-referred         Assessment/plan:   Fatuma Polanco is a 41 year old female with history with persistent symptoms of headaches, dizziness as well as lower abdomen symptoms.     Recent and historical LFT's have been normal. Liver function is otherwise normal without stigmata of advanced liver disease. Given clinical history, symptoms and labs, do not think any liver disease contributing. Do not think any further hepatobiliary work-up is indicated.    She does have some GI symptoms including lower abdominal pain, constipation with tenemus and pain with defecation. Discussed consideration CRS evaluation with Pelvic  given symptoms and history of pelvic trauma/misscarriages.     # Lower abdominal pain / pain with bowel movements / history of pelvic floor problems:   - Consider abdominal imaging   - Consider Pelvic Floor Referral (w/ CRS specialist)   - Continue optimization of mental health     # Follow-up in Hepatology clinic in clinic as needed     Manny Paulino PA-C   [facility]     -----------------------------------------------------       HPI:   Fatuma Polanco is a 41 year old female  presenting for the evaluation of her liver function.     Hospitalized with dizziness and headaches.  Work-up halted during pregnancy last year,.Had twins, induced at 35 weeks.  She was recommend to have her liver assessed in the setting of multiple symptoms including headaches, dizziness and abdominal bloating.     Per outside labs, LFT's have historically been normal.   Recent ALT 14, AST 15. No previous problems with liver.     Appetite is okay. Seeing a nutritionist. Didn't notice any changes with gluten free diet. Eat a variety a foods, fruits/vegetables.     Previously 117 lbs, Gained about 40 lbs during pregnancy. Currently  140 lbs. Having a lot cramping of abdominal, towards the end of the day. Does feel like have to have a bowel movement. Trying to work on not straining to have bowel movements. This has been doing on for the past few yeras. Usually have bowel movements 2-3 times a week, which are painful. See  PT for pelvic floor and does some exercises.     History four miscarriages/other trauma. Headaches happening over past few yeras. In regards to mental health evaluation and therapy, she has EMDR, DBT and currently therapist weekly.     Patient denies jaundice, lower extremity edema,  or confusion.  Patient also denies melena, hematochezia or hematemesis. Patient denies weight loss, fevers, sweats or chills. Has lot of sweats around menstrual period.     PMH: VWD, headaches, dizziness, anxiety, TMJ, ADHD, OCPD, PTSD, asthma, plantar fascitis     SMH: Colpo - high grade dysplaisa. S/p LEEP, history lung collapse s/p chest tube 1996, tonsillectomy, adenoidectomy     Medications: See below     No previous tobacco use. No history of significant alcohol use. No previous IV/IN drug use. Patient has 6 month old twins and four year old. No known family history of liver disease or liver labs.  Dad had alcohol overdose.     Previous work-up:  HCV antibody nonreactive  HBV SAb:   HBV SAg: nonreactive  HBV CAb:   HIV:  BECK:  F-actin  AMA:  Ferritin: 47.4  Iron Sats: 25%   TTG - normal   Alpha-1-antripsin  Ceruloplasmin   TSH 2.88   Cholesterol Total 185  HDL 52     Triglycerides 58  Hemoglobin A1c    Medical hx Surgical hx   Past Medical History:   Diagnosis Date     Abnormal Pap smear of cervix      Acute eczema      Anxiety      Asthma      Blood dyscrasia      Cervical dysplasia 2009     ELVA II (cervical intraepithelial neoplasia II) 7/09     Endometriosis 2009     Mental disorder      MTHFR mutation (H) 2020     Unspecified asthma(493.90)      Von Willebrand disease (H)     Past Surgical History:   Procedure Laterality Date      BIOPSY CERVICAL, LOCAL EXCISION, SINGLE/MULTIPLE  2009     C RAD RESEC TONSIL/PILLARS  1986    and adenoids     LEEP TX, CERVICAL  10/09    ELVA I, had hx of ELVA II on colp     MOUTH SURGERY  1993     OTHER SURGICAL HISTORY  2011    DENTOALVEOLAR  STRUCTURES     OTHER SURGICAL HISTORY  1996    LUNG REINFLATION SURGERY X2       TONSILLECTOMY & ADENOIDECTOMY  1985     [facility] NONSPECIFIC PROCEDURE  1995    ortho     [facility] NONSPECIFIC PROCEDURE  05/96    lung collapsed                 Medications:     Current Outpatient Medications   Medication     Albuterol Sulfate (PROAIR HFA) 108 (90 BASE) MCG/ACT AERS     fluticasone (FLONASE) 50 MCG/ACT nasal spray     Prenatal Multivit-Min-Fe-FA (PRENATAL VITAMINS) 0.8 MG TABS     No current facility-administered medications for this visit.            Allergies:     Allergies   Allergen Reactions     Alcohol Shortness Of Breath, Hives and Rash     Sulfates, vodka, rum  *NOT ALCOHOL WIPES FOR INJECTIONS*     Animal Dander      Dust Mites Cough     Causes asthma sxs     Mold      Seasonal Allergies             Social History:     Social History     Socioeconomic History     Marital status:      Spouse name: Not on file     Number of children: 0     Years of education: Not on file     Highest education level: Not on file   Occupational History     Occupation:    Tobacco Use     Smoking status: Never Smoker     Smokeless tobacco: Never Used   Substance and Sexual Activity     Alcohol use: No     Comment: very rarely     Drug use: No     Sexual activity: Yes     Partners: Male     Birth control/protection: Condom, None   Other Topics Concern     Not on file   Social History Narrative    Dairy/d 3 servings/d.     Caffeine 0-1 servings/d    Exercise 5 x week    Sunscreen used - Yes    Seatbelts used - Yes    Working smoke/CO detectors in the home - Yes    Guns stored in the home - No    Self Breast Exams - Yes    Eye Exam up to date - No    Dental Exam up to  "date - Yes    Pap Smear up to date - Yes    Mammogram up to date - NOT APPLICABLE    Dexa Scan up to date - NOT APPLICABLE    Flex Sig / Colonoscopy up to date - NOT APPLICABLE    Immunizations up to date - Yes    Abuse: Current or Past(Physical, Sexual or Emotional)- No    Do you feel safe in your environment - Yes        Ranjan Allen CMA                     Social Determinants of Health     Financial Resource Strain: Not on file   Food Insecurity: Not on file   Transportation Needs: Not on file   Physical Activity: Not on file   Stress: Not on file   Social Connections: Not on file   Intimate Partner Violence: Not on file   Housing Stability: Not on file            Family History:     Family History   Problem Relation Age of Onset     Hypertension Mother      Hypertension Father      Cerebrovascular Disease Maternal Grandmother      Cancer - colorectal Maternal Grandfather      Alcohol/Drug Father         Alcohol     Genitourinary Problems Sister      Lipids Mother      Hyperlipidemia Mother      Hyperlipidemia Father      Heart Disease Maternal Grandmother 94.00     Lung Cancer Maternal Grandfather               Review of Systems:   Gen: See HPI     HEENT: No change in vision or hearing, mouth sores, dysphagia, lymph nodes  Resp: No shortness of breath, coughing, hx of asthma  CV: No chest pain, palpitations, syncope   GI: See HPI  : No dysuria, history of stones, urine color    Skin: No rash; no pruritus or psoriasis  MS: No arthralgias, myalgias, joint swelling  Neuro: No memory changes, confusion, numbness    Heme: No difficulty clotting, bruising, bleeding  Psych:  No anxiety, depression, agitation          Physical Exam:   VS: /68 (BP Location: Right arm, Patient Position: Sitting, Cuff Size: Adult Regular)   Pulse 72   Temp 98.6  F (37  C) (Oral)   Resp 16   Ht 1.543 m (5' 0.75\")   Wt 64.3 kg (141 lb 11.2 oz)   SpO2 96%   BMI 27.00 kg/m      Gen: A&Ox3, NAD, well developed  HEENT: " non-icteric   Lung: CTA Bilatererally, no wheezing or crackles.   Lym- no palpable lymphadenopathy  Abd: soft, NT, ND, no palpable splenomegaly, liver is not palpable.   Ext: no edema, intact pulses.   Skin: No rash, no palmar erythema, telangiectasias or jaundice  Neuro: grossly intact, no asterixis   Psych: appropriate mood and affects         Data:   Reviewed in person and significant for:    Lab Results   Component Value Date     12/06/2021     02/11/2011      Lab Results   Component Value Date    POTASSIUM 4.0 12/06/2021    POTASSIUM 3.8 02/11/2011     Lab Results   Component Value Date    CHLORIDE 109 12/06/2021    CHLORIDE 104 02/11/2011     Lab Results   Component Value Date    CO2 27 12/06/2021    CO2 23 02/11/2011     Lab Results   Component Value Date    BUN 12 12/06/2021    BUN 12 02/11/2011     Lab Results   Component Value Date    CR 0.70 12/06/2021    CR 0.61 02/11/2011       Lab Results   Component Value Date    WBC 6.9 12/06/2021    WBC 4.4 09/21/2012     Lab Results   Component Value Date    HGB 12.6 12/06/2021    HGB 13.7 09/21/2012     Lab Results   Component Value Date    HCT 38.7 12/06/2021    HCT 41.1 09/21/2012     Lab Results   Component Value Date    MCV 90 12/06/2021    MCV 92 09/21/2012     Lab Results   Component Value Date     12/06/2021     09/21/2012       Lab Results   Component Value Date    AST 11 12/06/2021    AST 28 02/11/2011     Lab Results   Component Value Date    ALT 23 12/06/2021    ALT 13 02/11/2011     No results found for: BILICONJ   Lab Results   Component Value Date    BILITOTAL 0.4 12/06/2021    BILITOTAL 0.2 02/11/2011       Lab Results   Component Value Date    ALBUMIN 4.0 12/06/2021    ALBUMIN 4.6 02/11/2011     Lab Results   Component Value Date    PROTTOTAL 7.6 12/06/2021    PROTTOTAL 7.6 02/11/2011      Lab Results   Component Value Date    ALKPHOS 58 12/06/2021    ALKPHOS 52 02/11/2011       Lab Results   Component Value Date    INR  0.94 12/06/2021    INR 0.94 02/11/2011         Imaging:      No recent abdominal imaging

## 2021-12-06 NOTE — LETTER
12/6/2021     RE: Fatuma Polanco  [address] [facility] [address]      Hepatology Clinic note  Fatuma Polanco   Date of Birth 1980  Date of Service 12/6/2021    REASON FOR CONSULTATION: Evaluate liver  REFERRING PROVIDER: Self-referred         Assessment/plan:   Fatuma Polanco is a 41 year old female with history with persistent symptoms of headaches, dizziness as well as lower abdomen symptoms.     Recent and historical LFT's have been normal. Liver function is otherwise normal without stigmata of advanced liver disease. Given clinical history, symptoms and labs, do not think any liver disease contributing. Do not think any further hepatobiliary work-up is indicated.    She does have some GI symptoms including lower abdominal pain, constipation with tenemus and pain with defecation. Discussed consideration CRS evaluation with Pelvic  given symptoms and history of pelvic trauma/misscarriages.     # Lower abdominal pain / pain with bowel movements / history of pelvic floor problems:   - Consider abdominal imaging   - Consider Pelvic Floor Referral (w/ CRS specialist)   - Continue optimization of mental health     # Follow-up in Hepatology clinic in clinic as needed     Manny Paulino PA-C   [facility] Hepatology     -----------------------------------------------------       HPI:   Fatuma Polanoc is a 41 year old female  presenting for the evaluation of her liver function.     Hospitalized with dizziness and headaches.  Work-up halted during pregnancy last year,.Had twins, induced at 35 weeks.  She was recommend to have her liver assessed in the setting of multiple symptoms including headaches, dizziness and abdominal bloating.     Per outside labs, LFT's have historically been normal.   Recent ALT 14, AST 15. No previous problems with liver.     Appetite is okay. Seeing a nutritionist. Didn't notice any changes with gluten free diet. Eat a variety a foods,  fruits/vegetables.     Previously 117 lbs, Gained about 40 lbs during pregnancy. Currently 140 lbs. Having a lot cramping of abdominal, towards the end of the day. Does feel like have to have a bowel movement. Trying to work on not straining to have bowel movements. This has been doing on for the past few yeras. Usually have bowel movements 2-3 times a week, which are painful. See  PT for pelvic floor and does some exercises.     History four miscarriages/other trauma. Headaches happening over past few yeras. In regards to mental health evaluation and therapy, she has EMDR, DBT and currently therapist weekly.     Patient denies jaundice, lower extremity edema,  or confusion.  Patient also denies melena, hematochezia or hematemesis. Patient denies weight loss, fevers, sweats or chills. Has lot of sweats around menstrual period.     PMH: VWD, headaches, dizziness, anxiety, TMJ, ADHD, OCPD, PTSD, asthma, plantar fascitis     SMH: Colpo - high grade dysplaisa. S/p LEEP, history lung collapse s/p chest tube 1996, tonsillectomy, adenoidectomy     Medications: See below     No previous tobacco use. No history of significant alcohol use. No previous IV/IN drug use. Patient has 6 month old twins and four year old. No known family history of liver disease or liver labs.  Dad had alcohol overdose.     Previous work-up:  HCV antibody nonreactive  HBV SAb:   HBV SAg: nonreactive  HBV CAb:   HIV:  BECK:  F-actin  AMA:  Ferritin: 47.4  Iron Sats: 25%   TTG - normal   Alpha-1-antripsin  Ceruloplasmin   TSH 2.88   Cholesterol Total 185  HDL 52     Triglycerides 58  Hemoglobin A1c    Medical hx Surgical hx   Past Medical History:   Diagnosis Date     Abnormal Pap smear of cervix      Acute eczema      Anxiety      Asthma      Blood dyscrasia      Cervical dysplasia 2009     ELVA II (cervical intraepithelial neoplasia II) 7/09     Endometriosis 2009     Mental disorder      MTHFR mutation (H) 2020     Unspecified asthma(493.90)       Von Willebrand disease (H)     Past Surgical History:   Procedure Laterality Date     BIOPSY CERVICAL, LOCAL EXCISION, SINGLE/MULTIPLE  2009     C RAD RESEC TONSIL/PILLARS  1986    and adenoids     LEEP TX, CERVICAL  10/09    ELVA I, had hx of ELVA II on colp     MOUTH SURGERY  1993     OTHER SURGICAL HISTORY  2011    DENTOALVEOLAR  STRUCTURES     OTHER SURGICAL HISTORY  1996    LUNG REINFLATION SURGERY X2       TONSILLECTOMY & ADENOIDECTOMY  1985     [facility] NONSPECIFIC PROCEDURE  1995    ortho     [facility] NONSPECIFIC PROCEDURE  05/96    lung collapsed                 Medications:     Current Outpatient Medications   Medication     Albuterol Sulfate (PROAIR HFA) 108 (90 BASE) MCG/ACT AERS     fluticasone (FLONASE) 50 MCG/ACT nasal spray     Prenatal Multivit-Min-Fe-FA (PRENATAL VITAMINS) 0.8 MG TABS     No current facility-administered medications for this visit.            Allergies:     Allergies   Allergen Reactions     Alcohol Shortness Of Breath, Hives and Rash     Sulfates, vodka, rum  *NOT ALCOHOL WIPES FOR INJECTIONS*     Animal Dander      Dust Mites Cough     Causes asthma sxs     Mold      Seasonal Allergies             Social History:     Social History     Socioeconomic History     Marital status:      Spouse name: Not on file     Number of children: 0     Years of education: Not on file     Highest education level: Not on file   Occupational History     Occupation:    Tobacco Use     Smoking status: Never Smoker     Smokeless tobacco: Never Used   Substance and Sexual Activity     Alcohol use: No     Comment: very rarely     Drug use: No     Sexual activity: Yes     Partners: Male     Birth control/protection: Condom, None   Other Topics Concern     Not on file   Social History Narrative    Dairy/d 3 servings/d.     Caffeine 0-1 servings/d    Exercise 5 x week    Sunscreen used - Yes    Seatbelts used - Yes    Working smoke/CO detectors in the home - Yes    Guns stored in the  "home - No    Self Breast Exams - Yes    Eye Exam up to date - No    Dental Exam up to date - Yes    Pap Smear up to date - Yes    Mammogram up to date - NOT APPLICABLE    Dexa Scan up to date - NOT APPLICABLE    Flex Sig / Colonoscopy up to date - NOT APPLICABLE    Immunizations up to date - Yes    Abuse: Current or Past(Physical, Sexual or Emotional)- No    Do you feel safe in your environment - Yes        Ranjan Allen CMA                     Social Determinants of Health     Financial Resource Strain: Not on file   Food Insecurity: Not on file   Transportation Needs: Not on file   Physical Activity: Not on file   Stress: Not on file   Social Connections: Not on file   Intimate Partner Violence: Not on file   Housing Stability: Not on file            Family History:     Family History   Problem Relation Age of Onset     Hypertension Mother      Hypertension Father      Cerebrovascular Disease Maternal Grandmother      Cancer - colorectal Maternal Grandfather      Alcohol/Drug Father         Alcohol     Genitourinary Problems Sister      Lipids Mother      Hyperlipidemia Mother      Hyperlipidemia Father      Heart Disease Maternal Grandmother 94.00     Lung Cancer Maternal Grandfather               Review of Systems:   Gen: See HPI     HEENT: No change in vision or hearing, mouth sores, dysphagia, lymph nodes  Resp: No shortness of breath, coughing, hx of asthma  CV: No chest pain, palpitations, syncope   GI: See HPI  : No dysuria, history of stones, urine color    Skin: No rash; no pruritus or psoriasis  MS: No arthralgias, myalgias, joint swelling  Neuro: No memory changes, confusion, numbness    Heme: No difficulty clotting, bruising, bleeding  Psych:  No anxiety, depression, agitation          Physical Exam:   VS: /68 (BP Location: Right arm, Patient Position: Sitting, Cuff Size: Adult Regular)   Pulse 72   Temp 98.6  F (37  C) (Oral)   Resp 16   Ht 1.543 m (5' 0.75\")   Wt 64.3 kg (141 lb 11.2 oz) "   SpO2 96%   BMI 27.00 kg/m      Gen: A&Ox3, NAD, well developed  HEENT: non-icteric   Lung: CTA Bilatererally, no wheezing or crackles.   Lym- no palpable lymphadenopathy  Abd: soft, NT, ND, no palpable splenomegaly, liver is not palpable.   Ext: no edema, intact pulses.   Skin: No rash, no palmar erythema, telangiectasias or jaundice  Neuro: grossly intact, no asterixis   Psych: appropriate mood and affects         Data:   Reviewed in person and significant for:    Lab Results   Component Value Date     12/06/2021     02/11/2011      Lab Results   Component Value Date    POTASSIUM 4.0 12/06/2021    POTASSIUM 3.8 02/11/2011     Lab Results   Component Value Date    CHLORIDE 109 12/06/2021    CHLORIDE 104 02/11/2011     Lab Results   Component Value Date    CO2 27 12/06/2021    CO2 23 02/11/2011     Lab Results   Component Value Date    BUN 12 12/06/2021    BUN 12 02/11/2011     Lab Results   Component Value Date    CR 0.70 12/06/2021    CR 0.61 02/11/2011       Lab Results   Component Value Date    WBC 6.9 12/06/2021    WBC 4.4 09/21/2012     Lab Results   Component Value Date    HGB 12.6 12/06/2021    HGB 13.7 09/21/2012     Lab Results   Component Value Date    HCT 38.7 12/06/2021    HCT 41.1 09/21/2012     Lab Results   Component Value Date    MCV 90 12/06/2021    MCV 92 09/21/2012     Lab Results   Component Value Date     12/06/2021     09/21/2012       Lab Results   Component Value Date    AST 11 12/06/2021    AST 28 02/11/2011     Lab Results   Component Value Date    ALT 23 12/06/2021    ALT 13 02/11/2011     No results found for: BILICONJ   Lab Results   Component Value Date    BILITOTAL 0.4 12/06/2021    BILITOTAL 0.2 02/11/2011       Lab Results   Component Value Date    ALBUMIN 4.0 12/06/2021    ALBUMIN 4.6 02/11/2011     Lab Results   Component Value Date    PROTTOTAL 7.6 12/06/2021    PROTTOTAL 7.6 02/11/2011      Lab Results   Component Value Date    ALKPHOS 58 12/06/2021     ALKPHOS 52 02/11/2011       Lab Results   Component Value Date    INR 0.94 12/06/2021    INR 0.94 02/11/2011         Imaging:      No recent abdominal imaging       Manny Paulino PA-C

## 2022-01-12 VITALS — HEIGHT: 61 IN | BODY MASS INDEX: 29.27 KG/M2 | WEIGHT: 155 LBS

## 2022-01-15 ENCOUNTER — HEALTH MAINTENANCE LETTER (OUTPATIENT)
Age: 42
End: 2022-01-15

## 2022-01-18 VITALS — HEART RATE: 87 BPM | SYSTOLIC BLOOD PRESSURE: 115 MMHG | DIASTOLIC BLOOD PRESSURE: 79 MMHG

## 2022-08-19 ENCOUNTER — LAB REQUISITION (OUTPATIENT)
Dept: LAB | Facility: CLINIC | Age: 42
End: 2022-08-19
Payer: COMMERCIAL

## 2022-08-19 DIAGNOSIS — Z13.29 ENCOUNTER FOR SCREENING FOR OTHER SUSPECTED ENDOCRINE DISORDER: ICD-10-CM

## 2022-08-19 DIAGNOSIS — Z13.1 ENCOUNTER FOR SCREENING FOR DIABETES MELLITUS: ICD-10-CM

## 2022-08-19 DIAGNOSIS — Z13.220 ENCOUNTER FOR SCREENING FOR LIPOID DISORDERS: ICD-10-CM

## 2022-08-19 LAB
CHOLEST SERPL-MCNC: 204 MG/DL
HBA1C MFR BLD: 5.3 %
HDLC SERPL-MCNC: 57 MG/DL
LDLC SERPL CALC-MCNC: 126 MG/DL
NONHDLC SERPL-MCNC: 147 MG/DL
TRIGL SERPL-MCNC: 107 MG/DL
TSH SERPL DL<=0.005 MIU/L-ACNC: 3.32 UIU/ML (ref 0.3–4.2)

## 2022-08-19 PROCEDURE — 80061 LIPID PANEL: CPT | Performed by: OBSTETRICS & GYNECOLOGY

## 2022-08-19 PROCEDURE — 84443 ASSAY THYROID STIM HORMONE: CPT | Performed by: OBSTETRICS & GYNECOLOGY

## 2022-08-19 PROCEDURE — 83036 HEMOGLOBIN GLYCOSYLATED A1C: CPT | Mod: ORL | Performed by: OBSTETRICS & GYNECOLOGY

## 2022-10-12 ENCOUNTER — ANCILLARY PROCEDURE (OUTPATIENT)
Dept: MAMMOGRAPHY | Facility: HOSPITAL | Age: 42
End: 2022-10-12
Attending: INTERNAL MEDICINE
Payer: COMMERCIAL

## 2022-10-12 DIAGNOSIS — Z12.31 VISIT FOR SCREENING MAMMOGRAM: ICD-10-CM

## 2022-10-12 PROCEDURE — 77067 SCR MAMMO BI INCL CAD: CPT

## 2022-10-24 ENCOUNTER — ANCILLARY PROCEDURE (OUTPATIENT)
Dept: MAMMOGRAPHY | Facility: CLINIC | Age: 42
End: 2022-10-24
Attending: INTERNAL MEDICINE
Payer: COMMERCIAL

## 2022-10-24 DIAGNOSIS — N64.89 BREAST ASYMMETRY: ICD-10-CM

## 2022-10-24 PROCEDURE — 76642 ULTRASOUND BREAST LIMITED: CPT | Mod: RT

## 2022-10-24 PROCEDURE — 77061 BREAST TOMOSYNTHESIS UNI: CPT | Mod: RT

## 2022-12-26 ENCOUNTER — HEALTH MAINTENANCE LETTER (OUTPATIENT)
Age: 42
End: 2022-12-26

## 2023-08-28 ENCOUNTER — LAB REQUISITION (OUTPATIENT)
Dept: LAB | Facility: CLINIC | Age: 43
End: 2023-08-28
Payer: COMMERCIAL

## 2023-08-28 DIAGNOSIS — Z11.3 ENCOUNTER FOR SCREENING FOR INFECTIONS WITH A PREDOMINANTLY SEXUAL MODE OF TRANSMISSION: ICD-10-CM

## 2023-08-28 DIAGNOSIS — Z12.4 ENCOUNTER FOR SCREENING FOR MALIGNANT NEOPLASM OF CERVIX: ICD-10-CM

## 2023-08-28 DIAGNOSIS — Z13.220 ENCOUNTER FOR SCREENING FOR LIPOID DISORDERS: ICD-10-CM

## 2023-08-28 DIAGNOSIS — Z13.1 ENCOUNTER FOR SCREENING FOR DIABETES MELLITUS: ICD-10-CM

## 2023-08-28 PROCEDURE — 87624 HPV HI-RISK TYP POOLED RSLT: CPT | Mod: ORL | Performed by: NURSE PRACTITIONER

## 2023-08-28 PROCEDURE — 80061 LIPID PANEL: CPT | Mod: ORL | Performed by: NURSE PRACTITIONER

## 2023-08-28 PROCEDURE — 88141 CYTOPATH C/V INTERPRET: CPT | Performed by: PATHOLOGY

## 2023-08-28 PROCEDURE — 87591 N.GONORRHOEAE DNA AMP PROB: CPT | Mod: ORL | Performed by: NURSE PRACTITIONER

## 2023-08-28 PROCEDURE — 83036 HEMOGLOBIN GLYCOSYLATED A1C: CPT | Mod: ORL | Performed by: NURSE PRACTITIONER

## 2023-08-28 PROCEDURE — G0145 SCR C/V CYTO,THINLAYER,RESCR: HCPCS | Mod: ORL | Performed by: NURSE PRACTITIONER

## 2023-08-28 PROCEDURE — 87491 CHLMYD TRACH DNA AMP PROBE: CPT | Mod: ORL | Performed by: NURSE PRACTITIONER

## 2023-08-29 LAB
C TRACH DNA SPEC QL PROBE+SIG AMP: NEGATIVE
CHOLEST SERPL-MCNC: 173 MG/DL
HBA1C MFR BLD: 5.5 %
HDLC SERPL-MCNC: 55 MG/DL
LDLC SERPL CALC-MCNC: 96 MG/DL
N GONORRHOEA DNA SPEC QL NAA+PROBE: NEGATIVE
NONHDLC SERPL-MCNC: 118 MG/DL
TRIGL SERPL-MCNC: 110 MG/DL

## 2023-08-31 LAB
BKR LAB AP GYN ADEQUACY: ABNORMAL
BKR LAB AP GYN INTERPRETATION: ABNORMAL
BKR LAB AP HPV REFLEX: ABNORMAL
BKR LAB AP LMP: ABNORMAL
BKR LAB AP PREVIOUS ABNL DX: ABNORMAL
BKR LAB AP PREVIOUS ABNORMAL: ABNORMAL
PATH REPORT.COMMENTS IMP SPEC: ABNORMAL
PATH REPORT.COMMENTS IMP SPEC: ABNORMAL
PATH REPORT.RELEVANT HX SPEC: ABNORMAL

## 2023-09-05 LAB
HUMAN PAPILLOMA VIRUS 16 DNA: NEGATIVE
HUMAN PAPILLOMA VIRUS 18 DNA: NEGATIVE
HUMAN PAPILLOMA VIRUS FINAL DIAGNOSIS: NORMAL
HUMAN PAPILLOMA VIRUS OTHER HR: NEGATIVE

## 2023-11-09 ENCOUNTER — LAB REQUISITION (OUTPATIENT)
Dept: LAB | Facility: CLINIC | Age: 43
End: 2023-11-09
Payer: COMMERCIAL

## 2023-11-09 DIAGNOSIS — N93.9 ABNORMAL UTERINE AND VAGINAL BLEEDING, UNSPECIFIED: ICD-10-CM

## 2023-11-09 LAB
ERYTHROCYTE [DISTWIDTH] IN BLOOD BY AUTOMATED COUNT: 13.7 % (ref 10–15)
HCT VFR BLD AUTO: 41.1 % (ref 35–47)
HGB BLD-MCNC: 13.3 G/DL (ref 11.7–15.7)
MCH RBC QN AUTO: 30.4 PG (ref 26.5–33)
MCHC RBC AUTO-ENTMCNC: 32.4 G/DL (ref 31.5–36.5)
MCV RBC AUTO: 94 FL (ref 78–100)
PLATELET # BLD AUTO: 417 10E3/UL (ref 150–450)
RBC # BLD AUTO: 4.38 10E6/UL (ref 3.8–5.2)
WBC # BLD AUTO: 7.3 10E3/UL (ref 4–11)

## 2023-11-09 PROCEDURE — 84443 ASSAY THYROID STIM HORMONE: CPT | Mod: ORL | Performed by: OBSTETRICS & GYNECOLOGY

## 2023-11-09 PROCEDURE — 85027 COMPLETE CBC AUTOMATED: CPT | Mod: ORL | Performed by: OBSTETRICS & GYNECOLOGY

## 2023-11-09 PROCEDURE — 82728 ASSAY OF FERRITIN: CPT | Mod: ORL | Performed by: OBSTETRICS & GYNECOLOGY

## 2023-11-10 LAB
FERRITIN SERPL-MCNC: 55 NG/ML (ref 6–175)
TSH SERPL DL<=0.005 MIU/L-ACNC: 2.21 UIU/ML (ref 0.3–4.2)

## 2023-11-14 ENCOUNTER — ANCILLARY PROCEDURE (OUTPATIENT)
Dept: MAMMOGRAPHY | Facility: CLINIC | Age: 43
End: 2023-11-14
Attending: OBSTETRICS & GYNECOLOGY
Payer: COMMERCIAL

## 2023-11-14 DIAGNOSIS — Z12.31 VISIT FOR SCREENING MAMMOGRAM: ICD-10-CM

## 2023-11-14 PROCEDURE — 77067 SCR MAMMO BI INCL CAD: CPT

## 2024-11-10 ENCOUNTER — HEALTH MAINTENANCE LETTER (OUTPATIENT)
Age: 44
End: 2024-11-10

## 2024-11-15 ENCOUNTER — LAB REQUISITION (OUTPATIENT)
Dept: LAB | Facility: CLINIC | Age: 44
End: 2024-11-15
Payer: COMMERCIAL

## 2024-11-15 ENCOUNTER — ANCILLARY PROCEDURE (OUTPATIENT)
Dept: MAMMOGRAPHY | Facility: CLINIC | Age: 44
End: 2024-11-15
Attending: OBSTETRICS & GYNECOLOGY
Payer: COMMERCIAL

## 2024-11-15 DIAGNOSIS — Z12.4 ENCOUNTER FOR SCREENING FOR MALIGNANT NEOPLASM OF CERVIX: ICD-10-CM

## 2024-11-15 DIAGNOSIS — Z12.31 VISIT FOR SCREENING MAMMOGRAM: ICD-10-CM

## 2024-11-15 PROCEDURE — 77063 BREAST TOMOSYNTHESIS BI: CPT

## 2024-11-15 PROCEDURE — 87624 HPV HI-RISK TYP POOLED RSLT: CPT | Mod: ORL | Performed by: OBSTETRICS & GYNECOLOGY

## 2024-11-15 PROCEDURE — G0145 SCR C/V CYTO,THINLAYER,RESCR: HCPCS | Mod: ORL | Performed by: OBSTETRICS & GYNECOLOGY

## 2024-11-18 LAB
HPV HR 12 DNA CVX QL NAA+PROBE: NEGATIVE
HPV16 DNA CVX QL NAA+PROBE: NEGATIVE
HPV18 DNA CVX QL NAA+PROBE: NEGATIVE
HUMAN PAPILLOMA VIRUS FINAL DIAGNOSIS: NORMAL

## 2024-11-21 LAB
BKR AP ASSOCIATED HPV REPORT: NORMAL
BKR LAB AP GYN ADEQUACY: NORMAL
BKR LAB AP GYN INTERPRETATION: NORMAL
BKR LAB AP LMP: NORMAL
BKR LAB AP PREVIOUS ABNL DX: NORMAL
BKR LAB AP PREVIOUS ABNORMAL: NORMAL
PATH REPORT.COMMENTS IMP SPEC: NORMAL
PATH REPORT.COMMENTS IMP SPEC: NORMAL
PATH REPORT.RELEVANT HX SPEC: NORMAL

## 2024-11-22 ENCOUNTER — LAB REQUISITION (OUTPATIENT)
Dept: LAB | Facility: CLINIC | Age: 44
End: 2024-11-22
Payer: COMMERCIAL

## 2024-11-22 DIAGNOSIS — Z13.29 ENCOUNTER FOR SCREENING FOR OTHER SUSPECTED ENDOCRINE DISORDER: ICD-10-CM

## 2024-11-22 DIAGNOSIS — Z13.1 ENCOUNTER FOR SCREENING FOR DIABETES MELLITUS: ICD-10-CM

## 2024-11-22 DIAGNOSIS — Z13.220 ENCOUNTER FOR SCREENING FOR LIPOID DISORDERS: ICD-10-CM

## 2024-11-22 LAB
CHOLEST SERPL-MCNC: 180 MG/DL
EST. AVERAGE GLUCOSE BLD GHB EST-MCNC: 111 MG/DL
FASTING STATUS PATIENT QL REPORTED: YES
HBA1C MFR BLD: 5.5 %
HDLC SERPL-MCNC: 65 MG/DL
LDLC SERPL CALC-MCNC: 101 MG/DL
NONHDLC SERPL-MCNC: 115 MG/DL
TRIGL SERPL-MCNC: 68 MG/DL
TSH SERPL DL<=0.005 MIU/L-ACNC: 2.58 UIU/ML (ref 0.3–4.2)

## 2024-11-22 PROCEDURE — 84443 ASSAY THYROID STIM HORMONE: CPT | Mod: ORL | Performed by: OBSTETRICS & GYNECOLOGY

## 2024-11-22 PROCEDURE — 83036 HEMOGLOBIN GLYCOSYLATED A1C: CPT | Mod: ORL | Performed by: OBSTETRICS & GYNECOLOGY

## 2024-11-22 PROCEDURE — 80061 LIPID PANEL: CPT | Mod: ORL | Performed by: OBSTETRICS & GYNECOLOGY

## 2025-03-06 ENCOUNTER — HOSPITAL ENCOUNTER (OUTPATIENT)
Facility: CLINIC | Age: 45
End: 2025-03-06
Attending: INTERNAL MEDICINE | Admitting: INTERNAL MEDICINE
Payer: COMMERCIAL